# Patient Record
Sex: MALE | Race: WHITE
[De-identification: names, ages, dates, MRNs, and addresses within clinical notes are randomized per-mention and may not be internally consistent; named-entity substitution may affect disease eponyms.]

---

## 2019-01-08 ENCOUNTER — HOSPITAL ENCOUNTER (EMERGENCY)
Dept: HOSPITAL 46 - ED | Age: 49
Discharge: HOME | End: 2019-01-08
Payer: COMMERCIAL

## 2019-01-08 VITALS — WEIGHT: 184.99 LBS | HEIGHT: 73 IN | BODY MASS INDEX: 24.52 KG/M2

## 2019-01-08 DIAGNOSIS — Z79.899: ICD-10-CM

## 2019-01-08 DIAGNOSIS — S61.211A: Primary | ICD-10-CM

## 2019-01-08 DIAGNOSIS — Z87.891: ICD-10-CM
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
## 2019-01-08 DIAGNOSIS — Z88.0: ICD-10-CM

## 2019-01-08 PROCEDURE — 0HQGXZZ REPAIR LEFT HAND SKIN, EXTERNAL APPROACH: ICD-10-PCS

## 2019-01-08 NOTE — XMS
PreManage Notification: BECKI ORTIZ MRN:[id number]
 
Security Information
 
Security Events
1 event(s) in the past 18 months
Most recent security events:
 
Elopement at [facility] 09/07/2018 17:27
  -    Patient eloped before treatment completed.
Details:
    LWBS
 
 
 
CRITERIA MET
------------
- Group Notification
- [facility] - 2 Visits in 30 Days
 
 
CARE PROVIDERS
-------------------------------------------------------------------------------------
GILBERTO HUFF     Current
 
PHONE: [phone]
-------------------------------------------------------------------------------------
STEPHEN PHELAN     Current
 
PHONE: Unknown
-------------------------------------------------------------------------------------
GILBERTO HUFF     Primary Care     Current
 
PHONE: Unknown
-------------------------------------------------------------------------------------
 
[facility] has no Care Guidelines for this patient.
 
E.D. VISIT COUNT (12 MO.)
-------------------------------------------------------------------------------------
[address] [place] [address]
 
4 [facility]
-------------------------------------------------------------------------------------
TOTAL 5
-------------------------------------------------------------------------------------
NOTE: Visits indicate total known visits.
 
ED/UCC VISIT TRACKING (12 MO.)
-------------------------------------------------------------------------------------
01/08/2019 13:13
[facility] [place]
 
TYPE: Emergency
 
COMPLAINT:
- L FINGER LAC/INJURY
-------------------------------------------------------------------------------------
12/20/2018 12:33
 
[facility] [place]
 
TYPE: Emergency
 
COMPLAINT:
- CHEST PAIN
 
DIAGNOSES:
- Adverse effect of amphetamines, initial encounter
- Personal history of other infectious and parasitic diseases
- Nicotine dependence, unspecified, uncomplicated
- Chest pain, unspecified
- Allergy status to penicillin
-------------------------------------------------------------------------------------
09/08/2018 04:03
[facility] [place]
 
TYPE: Emergency
 
COMPLAINT:
- FEET PAIN/INJURY
 
DIAGNOSES:
- Pain in right foot
- Allergy status to penicillin
- Contusion of left foot, initial encounter
- Activity, other involving climbing, rappelling and jumping off
- Exposure to other specified factors, initial encounter
- Contusion of right foot, initial encounter
- Other long term (current) drug therapy
-------------------------------------------------------------------------------------
09/07/2018 17:27
[facility]
 
TYPE: Emergency
 
COMPLAINT:
- BILATERAL HEEL/FEET PAIN
 
DIAGNOSES:
- Pain in left ankle and joints of left foot
- Procedure and treatment not carried out due to patient leaving prior to being seen
by health care provider
- Encounter for immunization
-------------------------------------------------------------------------------------
03/07/2018 12:47
[facility]     [place]
 
TYPE: Urgent Care
 
DIAGNOSES:
- Cellulitis of right upper limb
- Hand Swelling
-------------------------------------------------------------------------------------
01/23/2018 12:50
[facility] [place]
 
TYPE: Emergency
 
COMPLAINT:
 
- L LEG CELLULITIS
 
DIAGNOSES:
- Cellulitis of left lower limb
- Nicotine dependence, unspecified, uncomplicated
-------------------------------------------------------------------------------------
 
 
INPATIENT VISIT TRACKING (12 MO.)
No inpatient visits to display in this time frame
 
[email or web address]

## 2020-04-28 NOTE — XMS
Encounter Summary
  Created on: 2020
 
 Caverly, Paul Duane
 External Reference #: [id number]
 : 72
 Sex: Male
 
 Demographics
 
 
+-----------------------+-----------------------------+
| Address               | [address]                  |
|                       | [place]  [address] |
+-----------------------+-----------------------------+
| Home Phone            | [phone]             |
+-----------------------+-----------------------------+
| Preferred Language    | Unknown                     |
+-----------------------+-----------------------------+
| Marital Status        | Single                      |
+-----------------------+-----------------------------+
| Rastafarian Affiliation | Unknown                     |
+-----------------------+-----------------------------+
| Race                  | Unknown                     |
+-----------------------+-----------------------------+
| Ethnic Group          | Unknown                     |
+-----------------------+-----------------------------+
 
 
 Author
 
 
+--------------+--------------------------------------------+
| Author       | [facility]  |
|              | and [place]                                |
+--------------+--------------------------------------------+
| Organization | [facility]  |
|              | and [place]                                |
+--------------+--------------------------------------------+
| Address      | Unknown                                    |
+--------------+--------------------------------------------+
| Phone        | Unavailable                                |
+--------------+--------------------------------------------+
 
 
 
 Support
 
 
+---------+--------------+---------+-----------------+
| Name    | Relationship | Address | Phone           |
+---------+--------------+---------+-----------------+
| Name No | ECON         | Unknown | [phone] |
+---------+--------------+---------+-----------------+
 
 
 
 Care Team Providers
 
 
 
+-----------------------+------+-------------+
| Care Team Member Name | Role | Phone       |
+-----------------------+------+-------------+
 PCP  | Unavailable |
+-----------------------+------+-------------+
 
 
 
 Encounter Details
 
 
+--------+-----------+----------------------+--------------------+-------------+
| Date   | Type      | Department           | Care Team          | Description |
+--------+-----------+----------------------+--------------------+-------------+
| 10/04/ | [facility]  |   [facility] |   Parvez Vizcaino   |             |
|    | Encounter |  MED CTR XRAY  [address]    |             |
|        |           |  [address] [place]       | [address]    |             |
|        |           | [place] [address] | [place] [address]    |             |
|        |           |   [phone]       | [phone]       |             |
|        |           |                      | [phone] (Fax) |             |
+--------+-----------+----------------------+--------------------+-------------+
 
 
 
 Social History
 
 
+----------------+-------+-----------+--------+------+
| Tobacco Use    | Types | Packs/Day | Years  | Date |
|                |       |           | Used   |      |
+----------------+-------+-----------+--------+------+
| Never Assessed |       |           |        |      |
+----------------+-------+-----------+--------+------+
 
 
 
+------------------+---------------+
| Sex Assigned at  | Date Recorded |
| Birth            |               |
+------------------+---------------+
| Not on file      |               |
+------------------+---------------+
 
 
 
+----------------+-------------+-------------+
| Job Start Date | Occupation  | Industry    |
+----------------+-------------+-------------+
| Not on file    | Not on file | Not on file |
+----------------+-------------+-------------+
 
 
 
+----------------+--------------+------------+
| Travel History | Travel Start | Travel End |
+----------------+--------------+------------+
 
 
 
 
+-------------------------------------+
| No recent travel history available. |
+-------------------------------------+
 documented as of this encounter
 
 Plan of Treatment
 Not on filedocumented as of this encounter
 
 Visit Diagnoses
 Not on filedocumented in this encounter

## 2020-04-28 NOTE — XMS
Encounter Summary
  Created on: 2020
 
 Caverly, Paul Duane
 External Reference #: [id number]
 : 72
 Sex: Male
 
 Demographics
 
 
+-----------------------+-----------------------------+
| Address               | [address]                  |
|                       | [place]  [address] |
+-----------------------+-----------------------------+
| Home Phone            | [phone]             |
+-----------------------+-----------------------------+
| Preferred Language    | Unknown                     |
+-----------------------+-----------------------------+
| Marital Status        | Single                      |
+-----------------------+-----------------------------+
| Hinduism Affiliation | Unknown                     |
+-----------------------+-----------------------------+
| Race                  | Unknown                     |
+-----------------------+-----------------------------+
| Ethnic Group          | Unknown                     |
+-----------------------+-----------------------------+
 
 
 Author
 
 
+--------------+--------------------------------------------+
| Author       | [facility]  |
|              | and [place]                                |
+--------------+--------------------------------------------+
| Organization | [facility] [place]  |
|              | and [place]                                |
+--------------+--------------------------------------------+
| Address      | Unknown                                    |
+--------------+--------------------------------------------+
| Phone        | Unavailable                                |
+--------------+--------------------------------------------+
 
 
 
 Support
 
 
+---------+--------------+---------+-----------------+
| Name    | Relationship | Address | Phone           |
+---------+--------------+---------+-----------------+
| Name No | ECON         | Unknown | [phone] |
+---------+--------------+---------+-----------------+
 
 
 
 Care Team Providers
 
 
 
+-----------------------+------+-------------+
| Care Team Member Name | Role | Phone       |
+-----------------------+------+-------------+
 PCP  | Unavailable |
+-----------------------+------+-------------+
 
 
 
 Encounter Details
 
 
+--------+-----------+----------------------+-----------+-------------+
| Date   | Type      | Department           | Care Team | Description |
+--------+-----------+----------------------+-----------+-------------+
| / | Hospital  |   [facility] |           |             |
|    | Encounter |  MED CTR XRAY  [address] |           |             |
|        |           |  [address]       |           |             |
|        |           | [place] [address] |           |             |
|        |           |   [phone]       |           |             |
+--------+-----------+----------------------+-----------+-------------+
 
 
 
 Social History
 
 
+----------------+-------+-----------+--------+------+
| Tobacco Use    | Types | Packs/Day | Years  | Date |
|                |       |           | Used   |      |
+----------------+-------+-----------+--------+------+
| Never Assessed |       |           |        |      |
+----------------+-------+-----------+--------+------+
 
 
 
+------------------+---------------+
| Sex Assigned at  | Date Recorded |
| Birth            |               |
+------------------+---------------+
| Not on file      |               |
+------------------+---------------+
 
 
 
+----------------+-------------+-------------+
| Job Start Date | Occupation  | Industry    |
+----------------+-------------+-------------+
| Not on file    | Not on file | Not on file |
+----------------+-------------+-------------+
 
 
 
+----------------+--------------+------------+
| Travel History | Travel Start | Travel End |
+----------------+--------------+------------+
 
 
 
 
+-------------------------------------+
| No recent travel history available. |
+-------------------------------------+
 documented as of this encounter
 
 Plan of Treatment
 Not on filedocumented as of this encounter
 
 Visit Diagnoses
 Not on filedocumented in this encounter

## 2020-04-28 NOTE — XMS
Encounter Summary
  Created on: 2020
 
 Caverly, Paul Duane
 External Reference #: [id number]
 : 72
 Sex: Male
 
 Demographics
 
 
+-----------------------+-----------------------------+
| Address               | [address]                  |
|                       | [place]  [address] |
+-----------------------+-----------------------------+
| Home Phone            | [phone]             |
+-----------------------+-----------------------------+
| Preferred Language    | Unknown                     |
+-----------------------+-----------------------------+
| Marital Status        | Single                      |
+-----------------------+-----------------------------+
| Taoism Affiliation | Unknown                     |
+-----------------------+-----------------------------+
| Race                  | Unknown                     |
+-----------------------+-----------------------------+
| Ethnic Group          | Unknown                     |
+-----------------------+-----------------------------+
 
 
 Author
 
 
+--------------+--------------------------------------------+
| Author       | [facility] [place]  |
|              | and [place]                                |
+--------------+--------------------------------------------+
| Organization | [facility] [place]  |
|              | and [place]                                |
+--------------+--------------------------------------------+
| Address      | Unknown                                    |
+--------------+--------------------------------------------+
| Phone        | Unavailable                                |
+--------------+--------------------------------------------+
 
 
 
 Support
 
 
+---------+--------------+---------+-----------------+
| Name    | Relationship | Address | Phone           |
+---------+--------------+---------+-----------------+
| Name No | ECON         | Unknown | [phone] |
+---------+--------------+---------+-----------------+
 
 
 
 Care Team Providers
 
 
 
+-----------------------+------+-------------+
| Care Team Member Name | Role | Phone       |
+-----------------------+------+-------------+
 PCP  | Unavailable |
+-----------------------+------+-------------+
 
 
 
 Encounter Details
 
 
+--------+-----------+----------------------+-----------+-------------+
| Date   | Type      | Department           | Care Team | Description |
+--------+-----------+----------------------+-----------+-------------+
| 05/10/ | Hospital  |   [facility] |           |             |
|    | Encounter |  MED CTR EMERGENCY   |           |             |
|        |           | CENTER  [address] |           |             |
|        |           |   [place]    |           |             |
|        |           | [address]           |           |             |
|        |           | [phone]         |           |             |
+--------+-----------+----------------------+-----------+-------------+
 
 
 
 Social History
 
 
+----------------+-------+-----------+--------+------+
| Tobacco Use    | Types | Packs/Day | Years  | Date |
|                |       |           | Used   |      |
+----------------+-------+-----------+--------+------+
| Never Assessed |       |           |        |      |
+----------------+-------+-----------+--------+------+
 
 
 
+------------------+---------------+
| Sex Assigned at  | Date Recorded |
| Birth            |               |
+------------------+---------------+
| Not on file      |               |
+------------------+---------------+
 
 
 
+----------------+-------------+-------------+
| Job Start Date | Occupation  | Industry    |
+----------------+-------------+-------------+
| Not on file    | Not on file | Not on file |
+----------------+-------------+-------------+
 
 
 
+----------------+--------------+------------+
| Travel History | Travel Start | Travel End |
+----------------+--------------+------------+
 
 
 
 
+-------------------------------------+
| No recent travel history available. |
+-------------------------------------+
 documented as of this encounter
 
 Plan of Treatment
 Not on filedocumented as of this encounter
 
 Visit Diagnoses
 Not on filedocumented in this encounter

## 2020-04-28 NOTE — XMS
Encounter Summary
  Created on: 2020
 
 Caverly, Paul Duane
 External Reference #: [id number]
 : 72
 Sex: Male
 
 Demographics
 
 
+-----------------------+-----------------------------+
| Address               | [address]                  |
|                       | [place]  [address] |
+-----------------------+-----------------------------+
| Home Phone            | [phone]             |
+-----------------------+-----------------------------+
| Preferred Language    | Unknown                     |
+-----------------------+-----------------------------+
| Marital Status        | Single                      |
+-----------------------+-----------------------------+
| Sabianism Affiliation | Unknown                     |
+-----------------------+-----------------------------+
| Race                  | Unknown                     |
+-----------------------+-----------------------------+
| Ethnic Group          | Unknown                     |
+-----------------------+-----------------------------+
 
 
 Author
 
 
+--------------+--------------------------------------------+
| Author       | [facility] [place]  |
|              | and [place]                                |
+--------------+--------------------------------------------+
| Organization | [facility] [place]  |
|              | and [place]                                |
+--------------+--------------------------------------------+
| Address      | Unknown                                    |
+--------------+--------------------------------------------+
| Phone        | Unavailable                                |
+--------------+--------------------------------------------+
 
 
 
 Support
 
 
+---------+--------------+---------+-----------------+
| Name    | Relationship | Address | Phone           |
+---------+--------------+---------+-----------------+
| Name No | ECON         | Unknown | [phone] |
+---------+--------------+---------+-----------------+
 
 
 
 Care Team Providers
 
 
 
+-----------------------+------+-------------+
| Care Team Member Name | Role | Phone       |
+-----------------------+------+-------------+
 PCP  | Unavailable |
+-----------------------+------+-------------+
 
 
 
 Encounter Details
 
 
+--------+-----------+----------------------+--------------------+-------------+
| Date   | Type      | Department           | Care Team          | Description |
+--------+-----------+----------------------+--------------------+-------------+
| / | [facility]  |   [facility] |   Hayden Farris   |             |
|    | Encounter |  MED CTR EMERGENCY   | [address]   |             |
|        |           | CENTER  [address] | [address]   [place]   |             |
|        |           |   [place]    | [place] [address]    |             |
|        |           | [address]           | [phone]       |             |
|        |           | [phone]         | [phone] (Fax) |             |
+--------+-----------+----------------------+--------------------+-------------+
 
 
 
 Social History
 
 
+----------------+-------+-----------+--------+------+
| Tobacco Use    | Types | Packs/Day | Years  | Date |
|                |       |           | Used   |      |
+----------------+-------+-----------+--------+------+
| Never Assessed |       |           |        |      |
+----------------+-------+-----------+--------+------+
 
 
 
+------------------+---------------+
| Sex Assigned at  | Date Recorded |
| Birth            |               |
+------------------+---------------+
| Not on file      |               |
+------------------+---------------+
 
 
 
+----------------+-------------+-------------+
| Job Start Date | Occupation  | Industry    |
+----------------+-------------+-------------+
| Not on file    | Not on file | Not on file |
+----------------+-------------+-------------+
 
 
 
+----------------+--------------+------------+
| Travel History | Travel Start | Travel End |
+----------------+--------------+------------+
 
 
 
 
+-------------------------------------+
| No recent travel history available. |
+-------------------------------------+
 documented as of this encounter
 
 Plan of Treatment
 Not on filedocumented as of this encounter
 
 Visit Diagnoses
 Not on filedocumented in this encounter

## 2020-04-28 NOTE — XMS
Encounter Summary
  Created on: 2020
 
 Caverly, Paul Duane
 External Reference #: [id number]
 : 72
 Sex: Male
 
 Demographics
 
 
+-----------------------+-----------------------------+
| Address               | [address]                  |
|                       | [place]  [address] |
+-----------------------+-----------------------------+
| Home Phone            | [phone]             |
+-----------------------+-----------------------------+
| Preferred Language    | Unknown                     |
+-----------------------+-----------------------------+
| Marital Status        | Single                      |
+-----------------------+-----------------------------+
| Zoroastrian Affiliation | Unknown                     |
+-----------------------+-----------------------------+
| Race                  | Unknown                     |
+-----------------------+-----------------------------+
| Ethnic Group          | Unknown                     |
+-----------------------+-----------------------------+
 
 
 Author
 
 
+--------------+--------------------------------------------+
| Author       | [facility]  |
|              | and [place]                                |
+--------------+--------------------------------------------+
| Organization | [facility]  |
|              | and [place]                                |
+--------------+--------------------------------------------+
| Address      | Unknown                                    |
+--------------+--------------------------------------------+
| Phone        | Unavailable                                |
+--------------+--------------------------------------------+
 
 
 
 Support
 
 
+---------+--------------+---------+-----------------+
| Name    | Relationship | Address | Phone           |
+---------+--------------+---------+-----------------+
| Name No | ECON         | Unknown | [phone] |
+---------+--------------+---------+-----------------+
 
 
 
 Care Team Providers
 
 
 
+-----------------------+------+-------------+
| Care Team Member Name | Role | Phone       |
+-----------------------+------+-------------+
 PCP  | Unavailable |
+-----------------------+------+-------------+
 
 
 
 Encounter Details
 
 
+--------+-----------+----------------------+-----------+-------------+
| Date   | Type      | Department           | Care Team | Description |
+--------+-----------+----------------------+-----------+-------------+
| / | Hospital  |   [facility] |           |             |
|    | Encounter |  [facility]   |           |             |
|        |           | CENTER  [address] |           |             |
|        |           |   [place]    |           |             |
|        |           | [address]           |           |             |
|        |           | [phone]         |           |             |
+--------+-----------+----------------------+-----------+-------------+
 
 
 
 Social History
 
 
+----------------+-------+-----------+--------+------+
| Tobacco Use    | Types | Packs/Day | Years  | Date |
|                |       |           | Used   |      |
+----------------+-------+-----------+--------+------+
| Never Assessed |       |           |        |      |
+----------------+-------+-----------+--------+------+
 
 
 
+------------------+---------------+
| Sex Assigned at  | Date Recorded |
| Birth            |               |
+------------------+---------------+
| Not on file      |               |
+------------------+---------------+
 
 
 
+----------------+-------------+-------------+
| Job Start Date | Occupation  | Industry    |
+----------------+-------------+-------------+
| Not on file    | Not on file | Not on file |
+----------------+-------------+-------------+
 
 
 
+----------------+--------------+------------+
| Travel History | Travel Start | Travel End |
+----------------+--------------+------------+
 
 
 
 
+-------------------------------------+
| No recent travel history available. |
+-------------------------------------+
 documented as of this encounter
 
 Plan of Treatment
 Not on filedocumented as of this encounter
 
 Visit Diagnoses
 Not on filedocumented in this encounter

## 2020-04-28 NOTE — XMS
Encounter Summary
  Created on: 2020
 
 Caverly, Paul Duane
 External Reference #: [id number]
 : 72
 Sex: Male
 
 Demographics
 
 
+-----------------------+-----------------------------+
| Address               | [address]                  |
|                       | [place]  [address] |
+-----------------------+-----------------------------+
| Home Phone            | [phone]             |
+-----------------------+-----------------------------+
| Preferred Language    | Unknown                     |
+-----------------------+-----------------------------+
| Marital Status        | Single                      |
+-----------------------+-----------------------------+
| Faith Affiliation | Unknown                     |
+-----------------------+-----------------------------+
| Race                  | Unknown                     |
+-----------------------+-----------------------------+
| Ethnic Group          | Unknown                     |
+-----------------------+-----------------------------+
 
 
 Author
 
 
+--------------+--------------------------------------------+
| Author       | [facility]  |
|              | [facility] [place]                                |
+--------------+--------------------------------------------+
| Organization | [facility]  |
|              | and [place]                                |
+--------------+--------------------------------------------+
| Address      | Unknown                                    |
+--------------+--------------------------------------------+
| Phone        | Unavailable                                |
+--------------+--------------------------------------------+
 
 
 
 Support
 
 
+---------+--------------+---------+-----------------+
| Name    | Relationship | Address | Phone           |
+---------+--------------+---------+-----------------+
| Name No | ECON         | Unknown | [phone] |
+---------+--------------+---------+-----------------+
 
 
 
 Care Team Providers
 
 
 
+-----------------------+------+-------------+
| Care Team Member Name | Role | Phone       |
+-----------------------+------+-------------+
 PCP  | Unavailable |
+-----------------------+------+-------------+
 
 
 
 Encounter Details
 
 
+--------+-----------+----------------------+---------------------+-------------+
| Date   | Type      | Department           | Care Team           | Description |
+--------+-----------+----------------------+---------------------+-------------+
| / | Hospital  |   [facility] |   [facility]  |             |
|    | Encounter |  [facility]   | MD [facility]  [address]    |             |
|        |           | CENTER  [address] | [place]     |             |
|        |           |   [place]    | [place] [address]     |             |
|        |           | [address]           | [phone]        |             |
|        |           | [phone]         | [phone] (Fax)  |             |
+--------+-----------+----------------------+---------------------+-------------+
 
 
 
 Social History
 
 
+----------------+-------+-----------+--------+------+
| Tobacco Use    | Types | Packs/Day | Years  | Date |
|                |       |           | Used   |      |
+----------------+-------+-----------+--------+------+
| Never Assessed |       |           |        |      |
+----------------+-------+-----------+--------+------+
 
 
 
+------------------+---------------+
| Sex Assigned at  | Date Recorded |
| Birth            |               |
+------------------+---------------+
| Not on file      |               |
+------------------+---------------+
 
 
 
+----------------+-------------+-------------+
| Job Start Date | Occupation  | Industry    |
+----------------+-------------+-------------+
| Not on file    | Not on file | Not on file |
+----------------+-------------+-------------+
 
 
 
+----------------+--------------+------------+
| Travel History | Travel Start | Travel End |
+----------------+--------------+------------+
 
 
 
 
+-------------------------------------+
| No recent travel history available. |
+-------------------------------------+
 documented as of this encounter
 
 Plan of Treatment
 Not on filedocumented as of this encounter
 
 Visit Diagnoses
 Not on filedocumented in this encounter

## 2020-04-28 NOTE — XMS
Encounter Summary
  Created on: 2020
 
 Caverly, Paul Duane
 External Reference #: [id number]
 : 72
 Sex: Male
 
 Demographics
 
 
+-----------------------+-----------------------------+
| Address               | [address]                  |
|                       | [place]  [address] |
+-----------------------+-----------------------------+
| Home Phone            | [phone]             |
+-----------------------+-----------------------------+
| Preferred Language    | Unknown                     |
+-----------------------+-----------------------------+
| Marital Status        | Single                      |
+-----------------------+-----------------------------+
| Jewish Affiliation | Unknown                     |
+-----------------------+-----------------------------+
| Race                  | Unknown                     |
+-----------------------+-----------------------------+
| Ethnic Group          | Unknown                     |
+-----------------------+-----------------------------+
 
 
 Author
 
 
+--------------+--------------------------------------------+
| Author       | [facility]  |
|              | and [place]                                |
+--------------+--------------------------------------------+
| Organization | [facility]  |
|              | and [place]                                |
+--------------+--------------------------------------------+
| Address      | Unknown                                    |
+--------------+--------------------------------------------+
| Phone        | Unavailable                                |
+--------------+--------------------------------------------+
 
 
 
 Support
 
 
+---------+--------------+---------+-----------------+
| Name    | Relationship | Address | Phone           |
+---------+--------------+---------+-----------------+
| Name No | ECON         | Unknown | [phone] |
+---------+--------------+---------+-----------------+
 
 
 
 Care Team Providers
 
 
 
+-----------------------+------+-------------+
| Care Team Member Name | Role | Phone       |
+-----------------------+------+-------------+
| No, Physician         | PCP  | Unavailable |
+-----------------------+------+-------------+
 
 
 
 Reason for Visit
 
 
+---------------+-----------------------------+
| Reason        | Comments                    |
+---------------+-----------------------------+
| Hand Swelling | room 2/ right hand x 3 days |
+---------------+-----------------------------+
 
 
 
 Encounter Details
 
 
+--------+---------+----------------------+----------------------+----------------------+
| Date   | Type    | Department           | Care Team            | Description          |
+--------+---------+----------------------+----------------------+----------------------+
| / | Office  |   [facility]   |   Damian Vidal | Cellulitis of right  |
| 2018   | Visit   | CARE  [address] |  MD [place]OTHY  [address]   | hand (Primary Dx)    |
|        |         |   [place]    | [address]  [place] |                      |
|        |         | [address]           |  [address]  [phone] |                      |
|        |         | [phone]         |   [phone] (Fax) |                      |
+--------+---------+----------------------+----------------------+----------------------+
 
 
 
 Social History
 
 
+--------------------+-------+-----------+--------+------+
| Tobacco Use        | Types | Packs/Day | Years  | Date |
|                    |       |           | Used   |      |
+--------------------+-------+-----------+--------+------+
| Current Every Day  |       |           |        |      |
| Smoker             |       |           |        |      |
+--------------------+-------+-----------+--------+------+
 
 
 
+---------------------+---+---+---+
| Smokeless Tobacco:  |   |   |   |
| Never Used          |   |   |   |
+---------------------+---+---+---+
 
 
 
+------------------+---------------+
| Sex Assigned at  | Date Recorded |
| Birth            |               |
+------------------+---------------+
 
| Not on file      |               |
+------------------+---------------+
 
 
 
+----------------+-------------+-------------+
| Job Start Date | Occupation  | Industry    |
+----------------+-------------+-------------+
| Not on file    | Not on file | Not on file |
+----------------+-------------+-------------+
 
 
 
+----------------+--------------+------------+
| Travel History | Travel Start | Travel End |
+----------------+--------------+------------+
 
 
 
+-------------------------------------+
| No recent travel history available. |
+-------------------------------------+
 documented as of this encounter
 
 Last Filed Vital Signs
 
 
+-------------------+----------------------+----------------------+----------+
| Vital Sign        | Reading              | Time Taken           | Comments |
+-------------------+----------------------+----------------------+----------+
| Blood Pressure    | 124/84               | 2018  1:11 PM  |          |
|                   |                      | PST                  |          |
+-------------------+----------------------+----------------------+----------+
| Pulse             | 103                  | 2018  1:11 PM  |          |
|                   |                      | PST                  |          |
+-------------------+----------------------+----------------------+----------+
| Temperature       | 36.3   C (97.4   F)  | 2018  1:11 PM  |          |
|                   |                      | PST                  |          |
+-------------------+----------------------+----------------------+----------+
| Respiratory Rate  | 16                   | 2018  1:11 PM  |          |
|                   |                      | PST                  |          |
+-------------------+----------------------+----------------------+----------+
| Oxygen Saturation | 99%                  | 2018  1:11 PM  |          |
|                   |                      | PST                  |          |
+-------------------+----------------------+----------------------+----------+
| Inhaled Oxygen    | -                    | -                    |          |
| Concentration     |                      |                      |          |
+-------------------+----------------------+----------------------+----------+
| Weight            | 87.3 kg (192 lb 7.4  | 2018  1:11 PM  |          |
|                   | oz)                  | PST                  |          |
+-------------------+----------------------+----------------------+----------+
| Height            | 185.4 cm (6' 1")     | 2018  1:11 PM  |          |
|                   |                      | PST                  |          |
+-------------------+----------------------+----------------------+----------+
| Body Mass Index   | 25.39                | 2018  1:11 PM  |          |
|                   |                      | PST                  |          |
+-------------------+----------------------+----------------------+----------+
 documented in this encounter
 
 Patient Instructions
 
 Patient Instructions Damian Vidal MD - 2018 12:45 PM PSTRocephin shot was pr
ovided for infection.
 Take Keflex 500 mg 4 times daily for 10 days for skin infection.
 If you develop an itchy rash, hives, lip swelling or wheezing, stop Keflex and report to [facility] emergency room.
 Take Naprosyn 500 mg, 1 tablet with food twice daily for 5 days, then as needed for pain an
d swelling.
 Take tramadol 50 mg, one tablet up to 3 times daily as needed for breakthrough pain.
 The right hand and arm above the level of your heart as needed for pain and swelling.
 Use warm, moist compresses on the hand and forearm for 15 minutes at a time 3 times daily u
ntil improved.
 Stay home from work avoiding repetitive gripping, grasping and lifting with the right hand 
until symptoms have resolved.
 Return for reevaluation of your hand and forearm tomorrow.
 Report to emergency room if he developed progressive pain, redness, swelling, nausea, vomit
ing or other new rapidly progressive associated symptoms.
 
 Cellulitis
 Cellulitis is an infection of the deep layers of skin. A break in the skin, such as a cut o
r scratch, can let bacteria under the skin. If the bacteria get to deep layers of the skin, 
it can be serious. If not treated, cellulitis can get into the bloodstream and lymph nodes. 
The infection can then spread throughout the body. This causes serious illness.
 Cellulitis causes the affected skin to become red, swollen, warm, and sore. The reddened ar
eas have a visible border. An open sore may leak fluid (pus). You may have a fever, chills, 
and pain.
 Cellulitis is treated with antibiotics taken for 7 to 10 days. An open sore may be cleaned 
and covered with cool wet gauze. Symptoms should get better 1 to 2 days after treatment is s
tarted. Make sure to take all the antibiotics for the full number of days until they are apryl
e. Keep taking the medicine even if your symptoms go away.
 Home care
 Follow these tips:
  Limit the use of the part of your body with cellulitis.
  If the infection is on your leg, keep your leg raised while sitting. This will help to r
educe swelling.
  Take all of the antibiotic medicine exactly as directed until it is gone. Do not miss an
y doses, especially during the first 7 days. Don  t stop taking the medicine when your symp
toms get better.
  Keep the affected area clean and dry.
  Wash your hands with soap and warm water before and after touching your skin. Anyone els
e who touches your skin should also wash his or her hands. Don't share towels.
 Follow-up care
 Follow up with your healthcare provider, or as advised. If your infection does not go away 
on the first antibiotic, your healthcare provider will prescribe a different one.
 When to seek medical advice
 Call your healthcare provider right away if any of these occur:
  Red areas that spread
  Swelling or pain that gets worse
  Fluid leaking from the skin (pus)
  Fever higher of 100.4 F (38.0 C) or higher after 2 days on antibiotics
 Date Last Reviewed: 2016-2017 [employer]. [address] [place] [address]. All righ
ts reserved. This information is not intended as a substitute for professional medical care.
 Always follow your healthcare professional's instructions.
 
 Electronically signed by Damian Vidal MD at 2018  2:31 PM PST
 documented in this encounter
 
 Progress Notes
 Mandy Lazaro RN - 2018 12:45 PM PSTFormatting of this note might be different
 from the original.
 
 Administrations This Visit  
  cefTRIAXone (ROCEPHIN) injection 1 g  
  Admin Date
 2018 Action
 Given Dose
 1 g Route
 Intramuscular Administered By
 Mandy Lazaro RN 
  
  
  lidocaine 1% injection 2.1 mL  
  Admin Date
 2018 Action
 Given Dose
 2.1 mL Route
 Intramuscular Administered By
 Mandy Lazaro RN 
  
  
  
 
 Pt tolerated well. Mandy Lazaro RN
 Electronically signed by Mandy Lazaro RN at 2018  2:55 PM Winston, Antonietta AGUIRRE MD - 2018 12:45 PM PSTFormatting of this note might be different from the origina
l.
 3/7/2018
 
 Paul Duane Caverly
 1970
 
 Assessment:
 1. Cellulitis of right hand  cefTRIAXone (ROCEPHIN) injection 1 g 
  lidocaine 1% injection 2.1 mL 
  naproxen (NAPROSYN) 500 mg tablet 
  traMADol (ULTRAM) 50 mg tablet 
  cephalexin (KEFLEX) 500 mg capsule 
 
 Three-day history of progressive cellulitis on the back of the right hand associated with m
ultiple small abrasions on the hand, a new tattoo on the right forearm placed 10 days ago.  
No evidence of septic arthritis or systemic symptoms.  He agrees with treatment with IM Roce
phin after discussing the risks and benefits given his penicillin allergy.  We discussed the
 option of starting oral clindamycin or waiting for intravenous dose of alternative antibiot
ic.  He opted for the Rocephin IM.  He was given the following instructions, prescriptions a
nd a note for work.  Follow-up here tomorrow is anticipated.
 
 Plan:
 
 Rocephin shot was provided for infection.
 Take Keflex 500 mg 4 times daily for 10 days for skin infection.
 If you develop an itchy rash, hives, lip swelling or wheezing, stop Keflex and report to [facility] emergency room.
 Take Naprosyn 500 mg, 1 tablet with food twice daily for 5 days, then as needed for pain an
d swelling.
 Take tramadol 50 mg, one tablet up to 3 times daily as needed for breakthrough pain.
 The right hand and arm above the level of your heart as needed for pain and swelling.
 Use warm, moist compresses on the hand and forearm for 15 minutes at a time 3 times daily u
ntil improved.
 Stay home from work avoiding repetitive gripping, grasping and lifting with the right hand 
until symptoms have resolved.
 Return for reevaluation of your hand and forearm tomorrow.
 
 Report to emergency room if he developed progressive pain, redness, swelling, nausea, vomit
ing or other new rapidly progressive associated symptoms.
 
 The risks and benefits, including potential side effects of medication changes, have been d
iscussed with the patient.  We agreed on implementing the current plan.
 The note may have been dictated using Dragon voice recognition software.  It may have not b
een proofread in entirety.  Minor errors in grammar may occur.
 
 History:
 Chief Complaint 
 Patient presents with 
   Hand Swelling 
   room 2/ right hand x 3 days 
 
 Paul Duane Caverly is a 48 y.o. male here for painful redness and swelling on the back of h
is right hand extending onto his forearm starting 3 days ago.  He is a  and frequently
 cuts his fingers and hands in the process of daily work.  He had a tattoo placed on the vol
ar aspect of his right forearm 10 days ago which has been without redness, swelling and drai
nage.  He had difficulty sleeping last night applying warm compress and elevating his arm.  
He went to work today and cannot due to pain with use of the hand and arm.  Denies other inj
ury to the right hand, no history of trauma. History of cellulitis of the left lower leg one
 month ago treated at [facility] with resolution, doesn't recall the antibiotic.  He
 denies fever but feels intermittent chills.  Mild nausea without vomiting.  He has eaten to
day with normal bowel movement.  No history of diabetes mellitus or MRSA infection.
 
 Current medications, past medical, surgical, family and social histories were reviewed and 
updated where appropriate.
 
 Allergies 
 Allergen Reactions 
   Amoxicillin Hives 
 
 Physical Exam:
 /84  | Pulse 103  | Temp 36.3 C (97.4 F) (Temporal)  | Resp 16  | Ht 1.854 m (6' 
1")  | Wt 87.3 kg (192 lb 7.4 oz)  | SpO2 99%  | BMI 25.39 kg/m 
 General: Well appearing, middle-aged male in no distress and appears stated age.
 Skin: Multiple, small, scabbed abrasions on multiple fingers and the back of his right hand
.  No pustules, fluctuance or purulent drainage.  Fingers are callused.  Diffusely erythemat
ous, tender, warm and edematous skin on back of his hand without fluctuance or ecchymosis.  
Mild erythema extends onto the dorsal aspect of the wrist and forearm with several small fol
licular pustules noted on the proximal forearm.  Again, no fluctuance or drainage.  New tatt
oo noted on the proximal, volar forearm without inflammation or drainage.
 Musculoskeletal: Opens and closes the fist completely with minimal pain.  Normal finger spl
ay.  Range of motion of the right wrist and elbow are complete with minimal discomfort.
 Lymphatic: Perhaps mild lymphangitis on the dorsum of the right forearm.  No antecubital or
 axillary adenopathy.
 Neurovascular: Intact radial pulses.  Light touch sensation and capillary refill intact thr
oughout the right hand.
 
 Damian Vdial M.D.
 
 Electronically signed by Damian Vidal MD at 2018  2:55 PM PSTdocumented in 
is encounter
 
 Plan of Treatment
 Not on filedocumented as of this encounter
 
 Visit Diagnoses
 
 
 
+------------------------------------------------------------------------------------------+
| Diagnosis                                                                                |
+------------------------------------------------------------------------------------------+
|   Cellulitis of right hand - Primary  Cellulitis and abscess of hand, except fingers and |
|  thumb                                                                                   |
+------------------------------------------------------------------------------------------+
 documented in this encounter
 
 Administered Medications
 
 
+-----------------------------------+--------+----------+------+------+----------+
| Medication Order                  | MAR    | Action   | Dose | Rate | Site     |
|                                   | Action | Date     |      |      |          |
+-----------------------------------+--------+----------+------+------+----------+
|   cefTRIAXone (ROCEPHIN)          | Given  | 20 | 1 g  |      | Ventrogl |
| injection 1 g  1 g,               |        | 18  2:15 |      |      | uteal-Le |
| Intramuscular, ONCE, Wed 3/7/18   |        |  PM PST  |      |      | ft       |
| at 1430, For 1 dose, MIX WITH 2.1 |        |          |      |      |          |
|  ML LIDOCAINE, Indications:       |        |          |      |      |          |
| PURULENT SKIN AND SOFT TISSUE     |        |          |      |      |          |
| INFECTION                         |        |          |      |      |          |
+-----------------------------------+--------+----------+------+------+----------+
 
 
 
+---+---+
|   |   |
+---+---+
 
 
 
+-----------------------------------+-------+----------+---------+---+----------+
|   lidocaine 1% injection 2.1 mL   | Given | 20 | 2.1 mLs |   | Ventrogl |
| 2.1 mL, Intramuscular, ONCE, Wed  |       | 18  2:18 |         |   | uteal-Le |
| 3/7/18 at 1430, For 1 dose        |       |  PM PST  |         |   | ft       |
+-----------------------------------+-------+----------+---------+---+----------+
 
 
 
+---+---+
|   |   |
+---+---+
 documented in this encounter

## 2020-04-28 NOTE — XMS
Encounter Summary
  Created on: 2020
 
 Caverly, Paul Duane
 External Reference #: [id number]
 : 72
 Sex: Male
 
 Demographics
 
 
+-----------------------+-----------------------------+
| Address               | [address]                  |
|                       | [facility] [place]  [address] |
+-----------------------+-----------------------------+
| Home Phone            | [phone]             |
+-----------------------+-----------------------------+
| Preferred Language    | Unknown                     |
+-----------------------+-----------------------------+
| Marital Status        | Single                      |
+-----------------------+-----------------------------+
| Episcopal Affiliation | Unknown                     |
+-----------------------+-----------------------------+
| Race                  | Unknown                     |
+-----------------------+-----------------------------+
| Ethnic Group          | Unknown                     |
+-----------------------+-----------------------------+
 
 
 Author
 
 
+--------------+--------------------------------------------+
| Author       | [facility]  |
|              | and [place]                                |
+--------------+--------------------------------------------+
| Organization | [facility]  |
|              | and [place]                                |
+--------------+--------------------------------------------+
| Address      | Unknown                                    |
+--------------+--------------------------------------------+
| Phone        | Unavailable                                |
+--------------+--------------------------------------------+
 
 
 
 Support
 
 
+---------+--------------+---------+-----------------+
| Name    | Relationship | Address | Phone           |
+---------+--------------+---------+-----------------+
| Name No | ECON         | Unknown | [phone] |
+---------+--------------+---------+-----------------+
 
 
 
 Care Team Providers
 
 
 
+-----------------------+------+-------------+
| Care Team Member Name | Role | Phone       |
+-----------------------+------+-------------+
 PCP  | Unavailable |
+-----------------------+------+-------------+
 
 
 
 Encounter Details
 
 
+--------+-----------+----------------------+----------------------+-------------+
| Date   | Type      | Department           | Care Team            | Description |
+--------+-----------+----------------------+----------------------+-------------+
| / | Hospital  |   [facility] |   Estrellita Hightower  |             |
|    | Encounter |  [facility]   | MD [facility]  |             |
|        |           | CENTER  [address] | [address]  [place],  |             |
|        |           |   [place]    | [place] [address]             |             |
|        |           | [address]           | [phone]         |             |
|        |           | [phone]         | [phone] (Fax)   |             |
+--------+-----------+----------------------+----------------------+-------------+
 
 
 
 Social History
 
 
+----------------+-------+-----------+--------+------+
| Tobacco Use    | Types | Packs/Day | Years  | Date |
|                |       |           | Used   |      |
+----------------+-------+-----------+--------+------+
| Never Assessed |       |           |        |      |
+----------------+-------+-----------+--------+------+
 
 
 
+------------------+---------------+
| Sex Assigned at  | Date Recorded |
| Birth            |               |
+------------------+---------------+
| Not on file      |               |
+------------------+---------------+
 
 
 
+----------------+-------------+-------------+
| Job Start Date | Occupation  | Industry    |
+----------------+-------------+-------------+
| Not on file    | Not on file | Not on file |
+----------------+-------------+-------------+
 
 
 
+----------------+--------------+------------+
| Travel History | Travel Start | Travel End |
+----------------+--------------+------------+
 
 
 
 
+-------------------------------------+
| No recent travel history available. |
+-------------------------------------+
 documented as of this encounter
 
 Plan of Treatment
 Not on filedocumented as of this encounter
 
 Visit Diagnoses
 Not on filedocumented in this encounter

## 2020-04-28 NOTE — XMS
Encounter Summary
  Created on: 2020
 
 Caverly, Paul Duane
 External Reference #: [id number]
 : 72
 Sex: Male
 
 Demographics
 
 
+-----------------------+-----------------------------+
| Address               | [address]                  |
|                       | [place]  [address] |
+-----------------------+-----------------------------+
| Home Phone            | [phone]             |
+-----------------------+-----------------------------+
| Preferred Language    | Unknown                     |
+-----------------------+-----------------------------+
| Marital Status        | Single                      |
+-----------------------+-----------------------------+
| Anabaptist Affiliation | Unknown                     |
+-----------------------+-----------------------------+
| Race                  | Unknown                     |
+-----------------------+-----------------------------+
| Ethnic Group          | Unknown                     |
+-----------------------+-----------------------------+
 
 
 Author
 
 
+--------------+--------------------------------------------+
| Author       | [facility]  |
|              | and [place]                                |
+--------------+--------------------------------------------+
| Organization | [facility]  |
|              | and [place]                                |
+--------------+--------------------------------------------+
| Address      | Unknown                                    |
+--------------+--------------------------------------------+
| Phone        | Unavailable                                |
+--------------+--------------------------------------------+
 
 
 
 Support
 
 
+---------+--------------+---------+-----------------+
| Name    | Relationship | Address | Phone           |
+---------+--------------+---------+-----------------+
| Name No | ECON         | Unknown | [phone] |
+---------+--------------+---------+-----------------+
 
 
 
 Care Team Providers
 
 
 
+-----------------------+------+-------------+
| Care Team Member Name | Role | Phone       |
+-----------------------+------+-------------+
 PCP  | Unavailable |
+-----------------------+------+-------------+
 
 
 
 Encounter Details
 
 
+--------+-----------+----------------------+----------------------+-------------+
| Date   | Type      | Department           | Care Team            | Description |
+--------+-----------+----------------------+----------------------+-------------+
| / | Hospital  |   [facility] |   Estrell[facility] Hight[facility]  |             |
|    | Encounter |  [facility]   | [facility]  |             |
|        |           | CENTER  [address] | [address]  [place],  |             |
|        |           |   [place]    | [place] [address]             |             |
|        |           | [address]           | [phone]         |             |
|        |           | [phone]         | [phone] (Fax)   |             |
+--------+-----------+----------------------+----------------------+-------------+
 
 
 
 Social History
 
 
+----------------+-------+-----------+--------+------+
| Tobacco Use    | Types | Packs/Day | Years  | Date |
|                |       |           | Used   |      |
+----------------+-------+-----------+--------+------+
| Never Assessed |       |           |        |      |
+----------------+-------+-----------+--------+------+
 
 
 
+------------------+---------------+
| Sex Assigned at  | Date Recorded |
| Birth            |               |
+------------------+---------------+
| Not on file      |               |
+------------------+---------------+
 
 
 
+----------------+-------------+-------------+
| Job Start Date | Occupation  | Industry    |
+----------------+-------------+-------------+
| Not on file    | Not on file | Not on file |
+----------------+-------------+-------------+
 
 
 
+----------------+--------------+------------+
| Travel History | Travel Start | Travel End |
+----------------+--------------+------------+
 
 
 
 
+-------------------------------------+
| No recent travel history available. |
+-------------------------------------+
 documented as of this encounter
 
 Plan of Treatment
 Not on filedocumented as of this encounter
 
 Visit Diagnoses
 Not on filedocumented in this encounter

## 2020-04-28 NOTE — XMS
Encounter Summary
  Created on: 2020
 
 Caverly, Paul Duane
 External Reference #: [id number]
 : 72
 Sex: Male
 
 Demographics
 
 
+-----------------------+-----------------------------+
| Address               | [address]                  |
|                       | [place]  [address] |
+-----------------------+-----------------------------+
| Home Phone            | [phone]             |
+-----------------------+-----------------------------+
| Preferred Language    | Unknown                     |
+-----------------------+-----------------------------+
| Marital Status        | Single                      |
+-----------------------+-----------------------------+
| Baptism Affiliation | Unknown                     |
+-----------------------+-----------------------------+
| Race                  | Unknown                     |
+-----------------------+-----------------------------+
| Ethnic Group          | Unknown                     |
+-----------------------+-----------------------------+
 
 
 Author
 
 
+--------------+--------------------------------------------+
| Author       | [facility]  |
|              | and [place]                                |
+--------------+--------------------------------------------+
| Organization | [facility]  |
|              | and [place]                                |
+--------------+--------------------------------------------+
| Address      | Unknown                                    |
+--------------+--------------------------------------------+
| Phone        | Unavailable                                |
+--------------+--------------------------------------------+
 
 
 
 Support
 
 
+---------+--------------+---------+-----------------+
| Name    | Relationship | Address | Phone           |
+---------+--------------+---------+-----------------+
| Name No | ECON         | Unknown | [phone] |
+---------+--------------+---------+-----------------+
 
 
 
 Care Team Providers
 
 
 
+-----------------------+------+-------------+
| Care Team Member Name | Role | Phone       |
+-----------------------+------+-------------+
 PCP  | Unavailable |
+-----------------------+------+-------------+
 
 
 
 Encounter Details
 
 
+--------+-----------+----------------------+-----------+-------------+
| Date   | Type      | Department           | Care Team | Description |
+--------+-----------+----------------------+-----------+-------------+
| / | Hospital  |   [facility] |           |             |
|    | Encounter |  [facility] EM[facility]Y   |           |             |
|        |           | CENTER  [address] |           |             |
|        |           |   [place]    |           |             |
|        |           | [address]           |           |             |
|        |           | [phone]         |           |             |
+--------+-----------+----------------------+-----------+-------------+
 
 
 
 Social History
 
 
+----------------+-------+-----------+--------+------+
| Tobacco Use    | Types | Packs/Day | Years  | Date |
|                |       |           | Used   |      |
+----------------+-------+-----------+--------+------+
| Never Assessed |       |           |        |      |
+----------------+-------+-----------+--------+------+
 
 
 
+------------------+---------------+
| Sex Assigned at  | Date Recorded |
| Birth            |               |
+------------------+---------------+
| Not on file      |               |
+------------------+---------------+
 
 
 
+----------------+-------------+-------------+
| Job Start Date | Occupation  | Industry    |
+----------------+-------------+-------------+
| Not on file    | Not on file | Not on file |
+----------------+-------------+-------------+
 
 
 
+----------------+--------------+------------+
| Travel History | Travel Start | Travel End |
+----------------+--------------+------------+
 
 
 
 
+-------------------------------------+
| No recent travel history available. |
+-------------------------------------+
 documented as of this encounter
 
 Plan of Treatment
 Not on filedocumented as of this encounter
 
 Visit Diagnoses
 Not on filedocumented in this encounter

## 2020-04-28 NOTE — XMS
PreManage Notification: BECKI ORTIZ MRN:[id number]
 
Security Information
 
Security Events
No recent Security Events currently on file
 
 
 
CRITERIA MET
------------
- Group Notification
- [facility] - Has Care Guidelines
 
 
CARE PROVIDERS
-------------------------------------------------------------------------------------
GILBERTO HUFF      Physician Assistant     Current
 
PHONE: [phone]
-------------------------------------------------------------------------------------
STEPHEN PHELAN      Physician Assistant     Current
 
PHONE: Unknown
-------------------------------------------------------------------------------------
 
[facility] has no Care Guidelines for this patient.
Care History
Medical/Surgical
01/09/2019    [facility]
 
      - W CALLED PATIENT 2X LEFT A VOICEMAIL. 
      - PATIENT NEEDS TO APPLY FOR MEDICAL BENEFITS- PLEASE CONTACT NICHELLE - 9838 TO HAVE PATIENT APPLY FOR BENEFITS.
      - PATIENT DOES NOT HAVE A PCP - PLEASE REFER PATIENT TO [facility] FOR 
      FOLLOW UP - TO ESTABLISH CARE.
      - W SENT NO PCP LETTER TO PATIENT.
E.D. VISIT COUNT (12 MO.)
-------------------------------------------------------------------------------------
1 [facility]
-------------------------------------------------------------------------------------
TOTAL 1
-------------------------------------------------------------------------------------
NOTE: Visits indicate total known visits.
 
ED/UCC VISIT TRACKING (12 MO.)
-------------------------------------------------------------------------------------
04/28/2020 13:46
[facility] [place]
 
TYPE: Emergency
 
COMPLAINT:
- RT FINGER INJURY
-------------------------------------------------------------------------------------
 
 
INPATIENT VISIT TRACKING (12 MO.)
No inpatient visits to display in this time frame
 
 
[email or web address]

## 2020-04-28 NOTE — XMS
Encounter Summary
  Created on: 2020
 
 Caverly, Paul Duane
 External Reference #: [id number]
 : 72
 Sex: Male
 
 Demographics
 
 
+-----------------------+-----------------------------+
| Address               | [address]                  |
|                       | [place]  [address] |
+-----------------------+-----------------------------+
| Home Phone            | [phone]             |
+-----------------------+-----------------------------+
| Preferred Language    | Unknown                     |
+-----------------------+-----------------------------+
| Marital Status        | Single                      |
+-----------------------+-----------------------------+
| Mu-ism Affiliation | Unknown                     |
+-----------------------+-----------------------------+
| Race                  | Unknown                     |
+-----------------------+-----------------------------+
| Ethnic Group          | Unknown                     |
+-----------------------+-----------------------------+
 
 
 Author
 
 
+--------------+--------------------------------------------+
| Author       | [facility] [place]  |
|              | and [place]                                |
+--------------+--------------------------------------------+
| Organization | [facility] [place]  |
|              | and [place]                                |
+--------------+--------------------------------------------+
| Address      | Unknown                                    |
+--------------+--------------------------------------------+
| Phone        | Unavailable                                |
+--------------+--------------------------------------------+
 
 
 
 Support
 
 
+---------+--------------+---------+-----------------+
| Name    | Relationship | Address | Phone           |
+---------+--------------+---------+-----------------+
| Name No | ECON         | Unknown | [phone] |
+---------+--------------+---------+-----------------+
 
 
 
 Care Team Providers
 
 
 
+-----------------------+------+-------------+
| Care Team Member Name | Role | Phone       |
+-----------------------+------+-------------+
 PCP  | Unavailable |
+-----------------------+------+-------------+
 
 
 
 Encounter Details
 
 
+--------+-----------+----------------------+-----------+-------------+
| Date   | Type      | Department           | Care Team | Description |
+--------+-----------+----------------------+-----------+-------------+
| / | Hospital  |   [facility] |           |             |
|    | Encounter |  [facility] EM[facility]Y   |           |             |
|        |           | CENTER  [address] |           |             |
|        |           |   [place]    |           |             |
|        |           | [address]           |           |             |
|        |           | [phone]         |           |             |
+--------+-----------+----------------------+-----------+-------------+
 
 
 
 Social History
 
 
+----------------+-------+-----------+--------+------+
| Tobacco Use    | Types | Packs/Day | Years  | Date |
|                |       |           | Used   |      |
+----------------+-------+-----------+--------+------+
| Never Assessed |       |           |        |      |
+----------------+-------+-----------+--------+------+
 
 
 
+------------------+---------------+
| Sex Assigned at  | Date Recorded |
| Birth            |               |
+------------------+---------------+
| Not on file      |               |
+------------------+---------------+
 
 
 
+----------------+-------------+-------------+
| Job Start Date | Occupation  | Industry    |
+----------------+-------------+-------------+
| Not on file    | Not on file | Not on file |
+----------------+-------------+-------------+
 
 
 
+----------------+--------------+------------+
| Travel History | Travel Start | Travel End |
+----------------+--------------+------------+
 
 
 
 
+-------------------------------------+
| No recent travel history available. |
+-------------------------------------+
 documented as of this encounter
 
 Plan of Treatment
 Not on filedocumented as of this encounter
 
 Visit Diagnoses
 Not on filedocumented in this encounter

## 2020-04-28 NOTE — XMS
Encounter Summary
  Created on: 2020
 
 Caverly, Paul Duane
 External Reference #: [id number]
 : 72
 Sex: Male
 
 Demographics
 
 
+-----------------------+-----------------------------+
| Address               | [address]                  |
|                       | [place]  [address] |
+-----------------------+-----------------------------+
| Home Phone            | [phone]             |
+-----------------------+-----------------------------+
| Preferred Language    | Unknown                     |
+-----------------------+-----------------------------+
| Marital Status        | Single                      |
+-----------------------+-----------------------------+
| Latter-day Affiliation | Unknown                     |
+-----------------------+-----------------------------+
| Race                  | Unknown                     |
+-----------------------+-----------------------------+
| Ethnic Group          | Unknown                     |
+-----------------------+-----------------------------+
 
 
 Author
 
 
+--------------+--------------------------------------------+
| Author       | [facility] [place]  |
|              | and [place]                                |
+--------------+--------------------------------------------+
| Organization | [facility] [place]  |
|              | and [place]                                |
+--------------+--------------------------------------------+
| Address      | Unknown                                    |
+--------------+--------------------------------------------+
| Phone        | Unavailable                                |
+--------------+--------------------------------------------+
 
 
 
 Support
 
 
+---------+--------------+---------+-----------------+
| Name    | Relationship | Address | Phone           |
+---------+--------------+---------+-----------------+
| Name No | ECON         | Unknown | [phone] |
+---------+--------------+---------+-----------------+
 
 
 
 Care Team Providers
 
 
 
+-----------------------+------+-------------+
| Care Team Member Name | Role | Phone       |
+-----------------------+------+-------------+
 PCP  | Unavailable |
+-----------------------+------+-------------+
 
 
 
 Encounter Details
 
 
+--------+-----------+----------------------+-----------+-------------+
| Date   | Type      | Department           | Care Team | Description |
+--------+-----------+----------------------+-----------+-------------+
| / | Hospital  |   [facility] |           |             |
|    | Encounter |  MED CTR XRAY  [address] |           |             |
|        |           |  [address]       |           |             |
|        |           | [place] [address] |           |             |
|        |           |   [phone]       |           |             |
+--------+-----------+----------------------+-----------+-------------+
 
 
 
 Social History
 
 
+----------------+-------+-----------+--------+------+
| Tobacco Use    | Types | Packs/Day | Years  | Date |
|                |       |           | Used   |      |
+----------------+-------+-----------+--------+------+
| Never Assessed |       |           |        |      |
+----------------+-------+-----------+--------+------+
 
 
 
+------------------+---------------+
| Sex Assigned at  | Date Recorded |
| Birth            |               |
+------------------+---------------+
| Not on file      |               |
+------------------+---------------+
 
 
 
+----------------+-------------+-------------+
| Job Start Date | Occupation  | Industry    |
+----------------+-------------+-------------+
| Not on file    | Not on file | Not on file |
+----------------+-------------+-------------+
 
 
 
+----------------+--------------+------------+
| Travel History | Travel Start | Travel End |
+----------------+--------------+------------+
 
 
 
 
+-------------------------------------+
| No recent travel history available. |
+-------------------------------------+
 documented as of this encounter
 
 Plan of Treatment
 Not on filedocumented as of this encounter
 
 Visit Diagnoses
 Not on filedocumented in this encounter

## 2020-04-28 NOTE — XMS
Encounter Summary
  Created on: 2020
 
 Caverly, Paul Duane
 External Reference #: [id number]
 : 72
 Sex: Male
 
 Demographics
 
 
+-----------------------+-----------------------------+
| Address               | [address]                  |
|                       | [place]  [address] |
+-----------------------+-----------------------------+
| Home Phone            | [phone]             |
+-----------------------+-----------------------------+
| Preferred Language    | Unknown                     |
+-----------------------+-----------------------------+
| Marital Status        | Single                      |
+-----------------------+-----------------------------+
| Spiritism Affiliation | Unknown                     |
+-----------------------+-----------------------------+
| Race                  | Unknown                     |
+-----------------------+-----------------------------+
| Ethnic Group          | Unknown                     |
+-----------------------+-----------------------------+
 
 
 Author
 
 
+--------------+--------------------------------------------+
| Author       | [facility] [place]  |
|              | and [place]                                |
+--------------+--------------------------------------------+
| Organization | [facility]  |
|              | and [place]                                |
+--------------+--------------------------------------------+
| Address      | Unknown                                    |
+--------------+--------------------------------------------+
| Phone        | Unavailable                                |
+--------------+--------------------------------------------+
 
 
 
 Support
 
 
+---------+--------------+---------+-----------------+
| Name    | Relationship | Address | Phone           |
+---------+--------------+---------+-----------------+
| Name No | ECON         | Unknown | [phone] |
+---------+--------------+---------+-----------------+
 
 
 
 Care Team Providers
 
 
 
+-----------------------+------+-------------+
| Care Team Member Name | Role | Phone       |
+-----------------------+------+-------------+
 PCP  | Unavailable |
+-----------------------+------+-------------+
 
 
 
 Encounter Details
 
 
+--------+-----------+----------------------+----------------------+-------------+
| Date   | Type      | Department           | Care Team            | Description |
+--------+-----------+----------------------+----------------------+-------------+
| / | Hospital  |   [facility] |   Estrell[facility] Hightower  |             |
|    | Encounter |  [facility] EM[facility]Y   | [facility]  |             |
|        |           | CENTER  [address]  [place],  |             |
|        |           |   [place]    | [place] [address]             |             |
|        |           | [address]           | [phone]         |             |
|        |           | [phone]         | [phone] (Fax)   |             |
+--------+-----------+----------------------+----------------------+-------------+
 
 
 
 Social History
 
 
+----------------+-------+-----------+--------+------+
| Tobacco Use    | Types | Packs/Day | Years  | Date |
|                |       |           | Used   |      |
+----------------+-------+-----------+--------+------+
| Never Assessed |       |           |        |      |
+----------------+-------+-----------+--------+------+
 
 
 
+------------------+---------------+
| Sex Assigned at  | Date Recorded |
| Birth            |               |
+------------------+---------------+
| Not on file      |               |
+------------------+---------------+
 
 
 
+----------------+-------------+-------------+
| Job Start Date | Occupation  | Industry    |
+----------------+-------------+-------------+
| Not on file    | Not on file | Not on file |
+----------------+-------------+-------------+
 
 
 
+----------------+--------------+------------+
| Travel History | Travel Start | Travel End |
+----------------+--------------+------------+
 
 
 
 
+-------------------------------------+
| No recent travel history available. |
+-------------------------------------+
 documented as of this encounter
 
 Plan of Treatment
 Not on filedocumented as of this encounter
 
 Visit Diagnoses
 Not on filedocumented in this encounter

## 2020-04-28 NOTE — XMS
Encounter Summary
  Created on: 2020
 
 Caverly, Paul Duane
 External Reference #: [id number]
 : 72
 Sex: Male
 
 Demographics
 
 
+-----------------------+-----------------------------+
| Address               | [address]                  |
|                       | [place]  [address] |
+-----------------------+-----------------------------+
| Home Phone            | [phone]             |
+-----------------------+-----------------------------+
| Preferred Language    | Unknown                     |
+-----------------------+-----------------------------+
| Marital Status        | Single                      |
+-----------------------+-----------------------------+
| Episcopal Affiliation | Unknown                     |
+-----------------------+-----------------------------+
| Race                  | Unknown                     |
+-----------------------+-----------------------------+
| Ethnic Group          | Unknown                     |
+-----------------------+-----------------------------+
 
 
 Author
 
 
+--------------+--------------------------------------------+
| Author       | [facility]  |
|              | and [place]                                |
+--------------+--------------------------------------------+
| Organization | [facility]  |
|              | and [place]                                |
+--------------+--------------------------------------------+
| Address      | Unknown                                    |
+--------------+--------------------------------------------+
| Phone        | Unavailable                                |
+--------------+--------------------------------------------+
 
 
 
 Support
 
 
+---------+--------------+---------+-----------------+
| Name    | Relationship | Address | Phone           |
+---------+--------------+---------+-----------------+
| Name No | ECON         | Unknown | [phone] |
+---------+--------------+---------+-----------------+
 
 
 
 Care Team Providers
 
 
 
+-----------------------+------+-------------+
| Care Team Member Name | Role | Phone       |
+-----------------------+------+-------------+
 PCP  | Unavailable |
+-----------------------+------+-------------+
 
 
 
 Encounter Details
 
 
+--------+-----------+----------------------+-----------+-------------+
| Date   | Type      | Department           | Care Team | Description |
+--------+-----------+----------------------+-----------+-------------+
| 05/10/ | Hospital  |   [facility] |           |             |
|    | Encounter |  MED CTR EMERGENCY   |           |             |
|        |           | CENTER  [address] |           |             |
|        |           |   [place]    |           |             |
|        |           | [address]           |           |             |
|        |           | [phone]         |           |             |
+--------+-----------+----------------------+-----------+-------------+
 
 
 
 Social History
 
 
+----------------+-------+-----------+--------+------+
| Tobacco Use    | Types | Packs/Day | Years  | Date |
|                |       |           | Used   |      |
+----------------+-------+-----------+--------+------+
| Never Assessed |       |           |        |      |
+----------------+-------+-----------+--------+------+
 
 
 
+------------------+---------------+
| Sex Assigned at  | Date Recorded |
| Birth            |               |
+------------------+---------------+
| Not on file      |               |
+------------------+---------------+
 
 
 
+----------------+-------------+-------------+
| Job Start Date | Occupation  | Industry    |
+----------------+-------------+-------------+
| Not on file    | Not on file | Not on file |
+----------------+-------------+-------------+
 
 
 
+----------------+--------------+------------+
| Travel History | Travel Start | Travel End |
+----------------+--------------+------------+
 
 
 
 
+-------------------------------------+
| No recent travel history available. |
+-------------------------------------+
 documented as of this encounter
 
 Plan of Treatment
 Not on filedocumented as of this encounter
 
 Visit Diagnoses
 Not on filedocumented in this encounter

## 2020-04-28 NOTE — XMS
Encounter Summary
  Created on: 2020
 
 Caverly, Paul Duane
 External Reference #: [id number]
 : 72
 Sex: Male
 
 Demographics
 
 
+-----------------------+-----------------------------+
| Address               | [address]                  |
|                       | [place]  [address] |
+-----------------------+-----------------------------+
| Home Phone            | [phone]             |
+-----------------------+-----------------------------+
| Preferred Language    | Unknown                     |
+-----------------------+-----------------------------+
| Marital Status        | Single                      |
+-----------------------+-----------------------------+
| Mormonism Affiliation | Unknown                     |
+-----------------------+-----------------------------+
| Race                  | Unknown                     |
+-----------------------+-----------------------------+
| Ethnic Group          | Unknown                     |
+-----------------------+-----------------------------+
 
 
 Author
 
 
+--------------+--------------------------------------------+
| Author       | [facility]  |
|              | and [place]                                |
+--------------+--------------------------------------------+
| Organization | [facility]  |
|              | and [place]                                |
+--------------+--------------------------------------------+
| Address      | Unknown                                    |
+--------------+--------------------------------------------+
| Phone        | Unavailable                                |
+--------------+--------------------------------------------+
 
 
 
 Support
 
 
+---------+--------------+---------+-----------------+
| Name    | Relationship | Address | Phone           |
+---------+--------------+---------+-----------------+
| Name No | ECON         | Unknown | [phone] |
+---------+--------------+---------+-----------------+
 
 
 
 Care Team Providers
 
 
 
+-----------------------+------+-------------+
| Care Team Member Name | Role | Phone       |
+-----------------------+------+-------------+
 PCP  | Unavailable |
+-----------------------+------+-------------+
 
 
 
 Encounter Details
 
 
+--------+-----------+----------------------+--------------------+-------------+
| Date   | Type      | Department           | Care Team          | Description |
+--------+-----------+----------------------+--------------------+-------------+
| 10/04/ | [facility]  |   [facility] |   Parvez Vizcaino   |             |
|    | Encounter |  MED CTR XRAY  [address] | [address]    |             |
|        |           |  [address]       | [address]    |             |
|        |           | [place] [address] | [place] [address]    |             |
|        |           |   [phone]       | [phone]       |             |
|        |           |                      | [phone] (Fax) |             |
+--------+-----------+----------------------+--------------------+-------------+
 
 
 
 Social History
 
 
+----------------+-------+-----------+--------+------+
| Tobacco Use    | Types | Packs/Day | Years  | Date |
|                |       |           | Used   |      |
+----------------+-------+-----------+--------+------+
| Never Assessed |       |           |        |      |
+----------------+-------+-----------+--------+------+
 
 
 
+------------------+---------------+
| Sex Assigned at  | Date Recorded |
| Birth            |               |
+------------------+---------------+
| Not on file      |               |
+------------------+---------------+
 
 
 
+----------------+-------------+-------------+
| Job Start Date | Occupation  | Industry    |
+----------------+-------------+-------------+
| Not on file    | Not on file | Not on file |
+----------------+-------------+-------------+
 
 
 
+----------------+--------------+------------+
| Travel History | Travel Start | Travel End |
+----------------+--------------+------------+
 
 
 
 
+-------------------------------------+
| No recent travel history available. |
+-------------------------------------+
 documented as of this encounter
 
 Plan of Treatment
 Not on filedocumented as of this encounter
 
 Visit Diagnoses
 Not on filedocumented in this encounter

## 2020-04-28 NOTE — XMS
Encounter Summary
  Created on: 2020
 
 Caverly, Paul Duane
 External Reference #: [id number]
 : 72
 Sex: Male
 
 Demographics
 
 
+-----------------------+-----------------------------+
| Address               | [address]                  |
|                       | [place]  [address] |
+-----------------------+-----------------------------+
| Home Phone            | [phone]             |
+-----------------------+-----------------------------+
| Preferred Language    | Unknown                     |
+-----------------------+-----------------------------+
| Marital Status        | Single                      |
+-----------------------+-----------------------------+
| Mosque Affiliation | Unknown                     |
+-----------------------+-----------------------------+
| Race                  | Unknown                     |
+-----------------------+-----------------------------+
| Ethnic Group          | Unknown                     |
+-----------------------+-----------------------------+
 
 
 Author
 
 
+--------------+--------------------------------------------+
| Author       | [facility]  |
|              | and [place]                                |
+--------------+--------------------------------------------+
| Organization | [facility]  |
|              | and [place]                                |
+--------------+--------------------------------------------+
| Address      | Unknown                                    |
+--------------+--------------------------------------------+
| Phone        | Unavailable                                |
+--------------+--------------------------------------------+
 
 
 
 Support
 
 
+---------+--------------+---------+-----------------+
| Name    | Relationship | Address | Phone           |
+---------+--------------+---------+-----------------+
| Name No | ECON         | Unknown | [phone] |
+---------+--------------+---------+-----------------+
 
 
 
 Care Team Providers
 
 
 
+-----------------------+------+-------------+
| Care Team Member Name | Role | Phone       |
+-----------------------+------+-------------+
 PCP  | Unavailable |
+-----------------------+------+-------------+
 
 
 
 Encounter Details
 
 
+--------+-----------+----------------------+----------------------+-------------+
| Date   | Type      | Department           | Care Team            | Description |
+--------+-----------+----------------------+----------------------+-------------+
| / | Hospital  |   [facility] |   Jada,       |             |
|    | Encounter |  MED CTR EMERGENCY   | Jerzy [facility] MD  [address]  |             |
|        |           | CENTER  [address] | [place]     |             |
|        |           |   [place]    | [place] [address] |             |
|        |           | [address]           |   [phone]       |             |
|        |           | [phone]         | [phone] (Fax)   |             |
+--------+-----------+----------------------+----------------------+-------------+
 
 
 
 Social History
 
 
+----------------+-------+-----------+--------+------+
| Tobacco Use    | Types | Packs/Day | Years  | Date |
|                |       |           | Used   |      |
+----------------+-------+-----------+--------+------+
| Never Assessed |       |           |        |      |
+----------------+-------+-----------+--------+------+
 
 
 
+------------------+---------------+
| Sex Assigned at  | Date Recorded |
| Birth            |               |
+------------------+---------------+
| Not on file      |               |
+------------------+---------------+
 
 
 
+----------------+-------------+-------------+
| Job Start Date | Occupation  | Industry    |
+----------------+-------------+-------------+
| Not on file    | Not on file | Not on file |
+----------------+-------------+-------------+
 
 
 
+----------------+--------------+------------+
| Travel History | Travel Start | Travel End |
+----------------+--------------+------------+
 
 
 
 
+-------------------------------------+
| No recent travel history available. |
+-------------------------------------+
 documented as of this encounter
 
 Plan of Treatment
 Not on filedocumented as of this encounter
 
 Visit Diagnoses
 Not on filedocumented in this encounter

## 2020-04-28 NOTE — XMS
Encounter Summary
  Created on: 2020
 
 Caverly, Paul Duane
 External Reference #: [id number]
 : 72
 Sex: Male
 
 Demographics
 
 
+-----------------------+-----------------------------+
| Address               | [address]                  |
|                       | [place]  [address] |
+-----------------------+-----------------------------+
| Home Phone            | [phone]             |
+-----------------------+-----------------------------+
| Preferred Language    | Unknown                     |
+-----------------------+-----------------------------+
| Marital Status        | Single                      |
+-----------------------+-----------------------------+
| Amish Affiliation | Unknown                     |
+-----------------------+-----------------------------+
| Race                  | Unknown                     |
+-----------------------+-----------------------------+
| Ethnic Group          | Unknown                     |
+-----------------------+-----------------------------+
 
 
 Author
 
 
+--------------+--------------------------------------------+
| Author       | [facility]  |
|              | and [place]                                |
+--------------+--------------------------------------------+
| Organization | [facility]  |
|              | and [place]                                |
+--------------+--------------------------------------------+
| Address      | Unknown                                    |
+--------------+--------------------------------------------+
| Phone        | Unavailable                                |
+--------------+--------------------------------------------+
 
 
 
 Support
 
 
+---------+--------------+---------+-----------------+
| Name    | Relationship | Address | Phone           |
+---------+--------------+---------+-----------------+
| Name No | ECON         | Unknown | [phone] |
+---------+--------------+---------+-----------------+
 
 
 
 Care Team Providers
 
 
 
+-----------------------+------+-------------+
| Care Team Member Name | Role | Phone       |
+-----------------------+------+-------------+
 PCP  | Unavailable |
+-----------------------+------+-------------+
 
 
 
 Encounter Details
 
 
+--------+-----------+----------------------+----------------------+-------------+
| Date   | Type      | Department           | Care Team            | Description |
+--------+-----------+----------------------+----------------------+-------------+
| / | Hospital  |   [facility] |   Estrellita Hightower  |             |
|    | Encounter |  MED CTR EMERGENCY   | MD Christine [address]ONG  |             |
|        |           | CENTER  [address] | [address]  [place],  |             |
|        |           |   [place]    | [place] [address]             |             |
|        |           | [address]           | [phone]         |             |
|        |           | [phone]         | [phone] (Fax)   |             |
+--------+-----------+----------------------+----------------------+-------------+
 
 
 
 Social History
 
 
+----------------+-------+-----------+--------+------+
| Tobacco Use    | Types | Packs/Day | Years  | Date |
|                |       |           | Used   |      |
+----------------+-------+-----------+--------+------+
| Never Assessed |       |           |        |      |
+----------------+-------+-----------+--------+------+
 
 
 
+------------------+---------------+
| Sex Assigned at  | Date Recorded |
| Birth            |               |
+------------------+---------------+
| Not on file      |               |
+------------------+---------------+
 
 
 
+----------------+-------------+-------------+
| Job Start Date | Occupation  | Industry    |
+----------------+-------------+-------------+
| Not on file    | Not on file | Not on file |
+----------------+-------------+-------------+
 
 
 
+----------------+--------------+------------+
| Travel History | Travel Start | Travel End |
+----------------+--------------+------------+
 
 
 
 
+-------------------------------------+
| No recent travel history available. |
+-------------------------------------+
 documented as of this encounter
 
 Plan of Treatment
 Not on filedocumented as of this encounter
 
 Visit Diagnoses
 Not on filedocumented in this encounter

## 2020-04-28 NOTE — XMS
Encounter Summary
  Created on: 2020
 
 Caverly, Paul Duane
 External Reference #: [id number]
 : 72
 Sex: Male
 
 Demographics
 
 
+-----------------------+-----------------------------+
| Address               | [address]                  |
|                       | [place]  [address] |
+-----------------------+-----------------------------+
| Home Phone            | [phone]             |
+-----------------------+-----------------------------+
| Preferred Language    | Unknown                     |
+-----------------------+-----------------------------+
| Marital Status        | Single                      |
+-----------------------+-----------------------------+
| Mu-ism Affiliation | Unknown                     |
+-----------------------+-----------------------------+
| Race                  | Unknown                     |
+-----------------------+-----------------------------+
| Ethnic Group          | Unknown                     |
+-----------------------+-----------------------------+
 
 
 Author
 
 
+--------------+--------------------------------------------+
| Author       | [facility]  |
|              | and [place]                                |
+--------------+--------------------------------------------+
| Organization | [facility]  |
|              | and [place]                                |
+--------------+--------------------------------------------+
| Address      | Unknown                                    |
+--------------+--------------------------------------------+
| Phone        | Unavailable                                |
+--------------+--------------------------------------------+
 
 
 
 Support
 
 
+---------+--------------+---------+-----------------+
| Name    | Relationship | Address | Phone           |
+---------+--------------+---------+-----------------+
| Name No | ECON         | Unknown | [phone] |
+---------+--------------+---------+-----------------+
 
 
 
 Care Team Providers
 
 
 
+-----------------------+------+-------------+
| Care Team Member Name | Role | Phone       |
+-----------------------+------+-------------+
 PCP  | Unavailable |
+-----------------------+------+-------------+
 
 
 
 Encounter Details
 
 
+--------+-----------+----------------------+----------------------+-------------+
| Date   | Type      | Department           | Care Team            | Description |
+--------+-----------+----------------------+----------------------+-------------+
| / | Hospital  |   [facility] |   Estrellita Hightower  |             |
|    | Encounter |  MED CTR EMERGENCY   | MD Christine [address]ONG  |             |
|        |           | CENTER  [address] | [address]  [place],  |             |
|        |           |   [place]    | [place] [address]             |             |
|        |           | [address]           | [phone]         |             |
|        |           | [phone]         | [phone] (Fax)   |             |
+--------+-----------+----------------------+----------------------+-------------+
 
 
 
 Social History
 
 
+----------------+-------+-----------+--------+------+
| Tobacco Use    | Types | Packs/Day | Years  | Date |
|                |       |           | Used   |      |
+----------------+-------+-----------+--------+------+
| Never Assessed |       |           |        |      |
+----------------+-------+-----------+--------+------+
 
 
 
+------------------+---------------+
| Sex Assigned at  | Date Recorded |
| Birth            |               |
+------------------+---------------+
| Not on file      |               |
+------------------+---------------+
 
 
 
+----------------+-------------+-------------+
| Job Start Date | Occupation  | Industry    |
+----------------+-------------+-------------+
| Not on file    | Not on file | Not on file |
+----------------+-------------+-------------+
 
 
 
+----------------+--------------+------------+
| Travel History | Travel Start | Travel End |
+----------------+--------------+------------+
 
 
 
 
+-------------------------------------+
| No recent travel history available. |
+-------------------------------------+
 documented as of this encounter
 
 Plan of Treatment
 Not on filedocumented as of this encounter
 
 Visit Diagnoses
 Not on filedocumented in this encounter

## 2020-04-28 NOTE — XMS
Encounter Summary
  Created on: 2020
 
 Caverly, Paul Duane
 External Reference #: [id number]
 : 72
 Sex: Male
 
 Demographics
 
 
+-----------------------+-----------------------------+
| Address               | [address]                  |
|                       | [place]  [address] |
+-----------------------+-----------------------------+
| Home Phone            | [phone]             |
+-----------------------+-----------------------------+
| Preferred Language    | Unknown                     |
+-----------------------+-----------------------------+
| Marital Status        | Single                      |
+-----------------------+-----------------------------+
| Amish Affiliation | Unknown                     |
+-----------------------+-----------------------------+
| Race                  | Unknown                     |
+-----------------------+-----------------------------+
| Ethnic Group          | Unknown                     |
+-----------------------+-----------------------------+
 
 
 Author
 
 
+--------------+--------------------------------------------+
| Author       | [facility]  |
|              | and [place]                                |
+--------------+--------------------------------------------+
| Organization | [facility]  |
|              | and [place]                                |
+--------------+--------------------------------------------+
| Address      | Unknown                                    |
+--------------+--------------------------------------------+
| Phone        | Unavailable                                |
+--------------+--------------------------------------------+
 
 
 
 Support
 
 
+---------+--------------+---------+-----------------+
| Name    | Relationship | Address | Phone           |
+---------+--------------+---------+-----------------+
| Name No | ECON         | Unknown | [phone] |
+---------+--------------+---------+-----------------+
 
 
 
 Care Team Providers
 
 
 
+-----------------------+------+-------------+
| Care Team Member Name | Role | Phone       |
+-----------------------+------+-------------+
 PCP  | Unavailable |
+-----------------------+------+-------------+
 
 
 
 Encounter Details
 
 
+--------+-----------+----------------------+---------------------+-------------+
| Date   | Type      | Department           | Care Team           | Description |
+--------+-----------+----------------------+---------------------+-------------+
| / | Hospital  |   [facility] |   Obed Hodge  |             |
|    | Encounter |  MED CTR EMERGENCY   | MD KAMIL[address]    |             |
|        |           | CENTER  [address] | [place]     |             |
|        |           |   [place]    | [place] [address]     |             |
|        |           | [address]           | [phone]        |             |
|        |           | [phone]         | [phone] (Fax)  |             |
+--------+-----------+----------------------+---------------------+-------------+
 
 
 
 Social History
 
 
+----------------+-------+-----------+--------+------+
| Tobacco Use    | Types | Packs/Day | Years  | Date |
|                |       |           | Used   |      |
+----------------+-------+-----------+--------+------+
| Never Assessed |       |           |        |      |
+----------------+-------+-----------+--------+------+
 
 
 
+------------------+---------------+
| Sex Assigned at  | Date Recorded |
| Birth            |               |
+------------------+---------------+
| Not on file      |               |
+------------------+---------------+
 
 
 
+----------------+-------------+-------------+
| Job Start Date | Occupation  | Industry    |
+----------------+-------------+-------------+
| Not on file    | Not on file | Not on file |
+----------------+-------------+-------------+
 
 
 
+----------------+--------------+------------+
| Travel History | Travel Start | Travel End |
+----------------+--------------+------------+
 
 
 
 
+-------------------------------------+
| No recent travel history available. |
+-------------------------------------+
 documented as of this encounter
 
 Plan of Treatment
 Not on filedocumented as of this encounter
 
 Visit Diagnoses
 Not on filedocumented in this encounter

## 2020-04-28 NOTE — XMS
Encounter Summary
  Created on: 2020
 
 Caverly, Paul Duane
 External Reference #: [id number]
 : 72
 Sex: Male
 
 Demographics
 
 
+-----------------------+-----------------------------+
| Address               | [address]                  |
|                       | [place]  [address] |
+-----------------------+-----------------------------+
| Home Phone            | [phone]             |
+-----------------------+-----------------------------+
| Preferred Language    | Unknown                     |
+-----------------------+-----------------------------+
| Marital Status        | Single                      |
+-----------------------+-----------------------------+
| Nondenominational Affiliation | Unknown                     |
+-----------------------+-----------------------------+
| Race                  | Unknown                     |
+-----------------------+-----------------------------+
| Ethnic Group          | Unknown                     |
+-----------------------+-----------------------------+
 
 
 Author
 
 
+--------------+--------------------------------------------+
| Author       | [facility]  |
|              | and [place]                                |
+--------------+--------------------------------------------+
| Organization | [facility]  |
|              | and [place]                                |
+--------------+--------------------------------------------+
| Address      | Unknown                                    |
+--------------+--------------------------------------------+
| Phone        | Unavailable                                |
+--------------+--------------------------------------------+
 
 
 
 Support
 
 
+---------+--------------+---------+-----------------+
| Name    | Relationship | Address | Phone           |
+---------+--------------+---------+-----------------+
| Name No | ECON         | Unknown | [phone] |
+---------+--------------+---------+-----------------+
 
 
 
 Care Team Providers
 
 
 
+-----------------------+------+-------------+
| Care Team Member Name | Role | Phone       |
+-----------------------+------+-------------+
 PCP  | Unavailable |
+-----------------------+------+-------------+
 
 
 
 Encounter Details
 
 
+--------+-----------+----------------------+----------------------+-------------+
| Date   | Type      | Department           | Care Team            | Description |
+--------+-----------+----------------------+----------------------+-------------+
| / | Hospital  |   [facility] |   Estrellita Hightower  |             |
|    | Encounter |  MED CTR EMERGENCY   | MD Christine [address]ONG  |             |
|        |           | CENTER  [address] | [address]  [place],  |             |
|        |           |   [place]    | [place] [address]             |             |
|        |           | [address]           | [phone]         |             |
|        |           | [phone]         | [phone] (Fax)   |             |
+--------+-----------+----------------------+----------------------+-------------+
 
 
 
 Social History
 
 
+----------------+-------+-----------+--------+------+
| Tobacco Use    | Types | Packs/Day | Years  | Date |
|                |       |           | Used   |      |
+----------------+-------+-----------+--------+------+
| Never Assessed |       |           |        |      |
+----------------+-------+-----------+--------+------+
 
 
 
+------------------+---------------+
| Sex Assigned at  | Date Recorded |
| Birth            |               |
+------------------+---------------+
| Not on file      |               |
+------------------+---------------+
 
 
 
+----------------+-------------+-------------+
| Job Start Date | Occupation  | Industry    |
+----------------+-------------+-------------+
| Not on file    | Not on file | Not on file |
+----------------+-------------+-------------+
 
 
 
+----------------+--------------+------------+
| Travel History | Travel Start | Travel End |
+----------------+--------------+------------+
 
 
 
 
+-------------------------------------+
| No recent travel history available. |
+-------------------------------------+
 documented as of this encounter
 
 Plan of Treatment
 Not on filedocumented as of this encounter
 
 Visit Diagnoses
 Not on filedocumented in this encounter

## 2020-04-28 NOTE — XMS
Encounter Summary
  Created on: 2020
 
 Caverly, Paul Duane
 External Reference #: [id number]
 : 72
 Sex: Male
 
 Demographics
 
 
+-----------------------+-----------------------------+
| Address               | [address]                  |
|                       | [place]  [address] |
+-----------------------+-----------------------------+
| Home Phone            | [phone]             |
+-----------------------+-----------------------------+
| Preferred Language    | Unknown                     |
+-----------------------+-----------------------------+
| Marital Status        | Single                      |
+-----------------------+-----------------------------+
| Sabianism Affiliation | Unknown                     |
+-----------------------+-----------------------------+
| Race                  | Unknown                     |
+-----------------------+-----------------------------+
| Ethnic Group          | Unknown                     |
+-----------------------+-----------------------------+
 
 
 Author
 
 
+--------------+--------------------------------------------+
| Author       | [facility]  |
|              | and [place]                                |
+--------------+--------------------------------------------+
| Organization | [facility]  |
|              | and [place]                                |
+--------------+--------------------------------------------+
| Address      | Unknown                                    |
+--------------+--------------------------------------------+
| Phone        | Unavailable                                |
+--------------+--------------------------------------------+
 
 
 
 Support
 
 
+---------+--------------+---------+-----------------+
| Name    | Relationship | Address | Phone           |
+---------+--------------+---------+-----------------+
| Name No | ECON         | Unknown | [phone] |
+---------+--------------+---------+-----------------+
 
 
 
 Care Team Providers
 
 
 
+-----------------------+------+-------------+
| Care Team Member Name | Role | Phone       |
+-----------------------+------+-------------+
 PCP  | Unavailable |
+-----------------------+------+-------------+
 
 
 
 Encounter Details
 
 
+--------+-----------+----------------------+---------------------+-------------+
| Date   | Type      | Department           | Care Team           | Description |
+--------+-----------+----------------------+---------------------+-------------+
| / | [facility]  |   [facility] |   Julia Vizcaino  |             |
|    | Encounter |  MED CTR XRAY  [address] | [address]     |             |
|        |           |  [address]       | [address]   |             |
|        |           | [place] [address] | [place] [address]     |             |
|        |           |   [phone]       | [phone]        |             |
|        |           |                      | [phone] (Fax)  |             |
+--------+-----------+----------------------+---------------------+-------------+
 
 
 
 Social History
 
 
+----------------+-------+-----------+--------+------+
| Tobacco Use    | Types | Packs/Day | Years  | Date |
|                |       |           | Used   |      |
+----------------+-------+-----------+--------+------+
| Never Assessed |       |           |        |      |
+----------------+-------+-----------+--------+------+
 
 
 
+------------------+---------------+
| Sex Assigned at  | Date Recorded |
| Birth            |               |
+------------------+---------------+
| Not on file      |               |
+------------------+---------------+
 
 
 
+----------------+-------------+-------------+
| Job Start Date | Occupation  | Industry    |
+----------------+-------------+-------------+
| Not on file    | Not on file | Not on file |
+----------------+-------------+-------------+
 
 
 
+----------------+--------------+------------+
| Travel History | Travel Start | Travel End |
+----------------+--------------+------------+
 
 
 
 
+-------------------------------------+
| No recent travel history available. |
+-------------------------------------+
 documented as of this encounter
 
 Plan of Treatment
 Not on filedocumented as of this encounter
 
 Visit Diagnoses
 Not on filedocumented in this encounter

## 2020-04-28 NOTE — XMS
Encounter Summary
  Created on: 2020
 
 Caverly, Paul Duane
 External Reference #: [id number]
 : 72
 Sex: Male
 
 Demographics
 
 
+-----------------------+-----------------------------+
| Address               | [address]                  |
|                       | [place]  [address] |
+-----------------------+-----------------------------+
| Home Phone            | [phone]             |
+-----------------------+-----------------------------+
| Preferred Language    | Unknown                     |
+-----------------------+-----------------------------+
| Marital Status        | Single                      |
+-----------------------+-----------------------------+
| Samaritan Affiliation | Unknown                     |
+-----------------------+-----------------------------+
| Race                  | Unknown                     |
+-----------------------+-----------------------------+
| Ethnic Group          | Unknown                     |
+-----------------------+-----------------------------+
 
 
 Author
 
 
+--------------+--------------------------------------------+
| Author       | [facility]  |
|              | and [place]                                |
+--------------+--------------------------------------------+
| Organization | [facility]  |
|              | and [place]                                |
+--------------+--------------------------------------------+
| Address      | Unknown                                    |
+--------------+--------------------------------------------+
| Phone        | Unavailable                                |
+--------------+--------------------------------------------+
 
 
 
 Support
 
 
+---------+--------------+---------+-----------------+
| Name    | Relationship | Address | Phone           |
+---------+--------------+---------+-----------------+
| Name No | ECON         | Unknown | [phone] |
+---------+--------------+---------+-----------------+
 
 
 
 Care Team Providers
 
 
 
+-----------------------+------+-------------+
| Care Team Member Name | Role | Phone       |
+-----------------------+------+-------------+
 PCP  | Unavailable |
+-----------------------+------+-------------+
 
 
 
 Encounter Details
 
 
+--------+-----------+----------------------+-----------+-------------+
| Date   | Type      | Department           | Care Team | Description |
+--------+-----------+----------------------+-----------+-------------+
| / | Hospital  |   [facility] |           |             |
|    | Encounter |  MED CTR EMERGENCY   |           |             |
|        |           | CENTER  [address] |           |             |
|        |           |   [place]    |           |             |
|        |           | [address]           |           |             |
|        |           | [phone]         |           |             |
+--------+-----------+----------------------+-----------+-------------+
 
 
 
 Social History
 
 
+----------------+-------+-----------+--------+------+
| Tobacco Use    | Types | Packs/Day | Years  | Date |
|                |       |           | Used   |      |
+----------------+-------+-----------+--------+------+
| Never Assessed |       |           |        |      |
+----------------+-------+-----------+--------+------+
 
 
 
+------------------+---------------+
| Sex Assigned at  | Date Recorded |
| Birth            |               |
+------------------+---------------+
| Not on file      |               |
+------------------+---------------+
 
 
 
+----------------+-------------+-------------+
| Job Start Date | Occupation  | Industry    |
+----------------+-------------+-------------+
| Not on file    | Not on file | Not on file |
+----------------+-------------+-------------+
 
 
 
+----------------+--------------+------------+
| Travel History | Travel Start | Travel End |
+----------------+--------------+------------+
 
 
 
 
+-------------------------------------+
| No recent travel history available. |
+-------------------------------------+
 documented as of this encounter
 
 Plan of Treatment
 Not on filedocumented as of this encounter
 
 Visit Diagnoses
 Not on filedocumented in this encounter

## 2020-04-28 NOTE — XMS
Encounter Summary
  Created on: 2020
 
 Caverly, Paul Duane
 External Reference #: [id number]
 : 72
 Sex: Male
 
 Demographics
 
 
+-----------------------+-----------------------------+
| Address               | [address]                  |
|                       | [place]  [address] |
+-----------------------+-----------------------------+
| Home Phone            | [phone]             |
+-----------------------+-----------------------------+
| Preferred Language    | Unknown                     |
+-----------------------+-----------------------------+
| Marital Status        | Single                      |
+-----------------------+-----------------------------+
| Evangelical Affiliation | Unknown                     |
+-----------------------+-----------------------------+
| Race                  | Unknown                     |
+-----------------------+-----------------------------+
| Ethnic Group          | Unknown                     |
+-----------------------+-----------------------------+
 
 
 Author
 
 
+--------------+--------------------------------------------+
| Author       | [facility]  |
|              | and [place]                                |
+--------------+--------------------------------------------+
| Organization | [facility] [place]  |
|              | and [place]                                |
+--------------+--------------------------------------------+
| Address      | Unknown                                    |
+--------------+--------------------------------------------+
| Phone        | Unavailable                                |
+--------------+--------------------------------------------+
 
 
 
 Support
 
 
+---------+--------------+---------+-----------------+
| Name    | Relationship | Address | Phone           |
+---------+--------------+---------+-----------------+
| Name No | ECON         | Unknown | [phone] |
+---------+--------------+---------+-----------------+
 
 
 
 Care Team Providers
 
 
 
+-----------------------+------+-------------+
| Care Team Member Name | Role | Phone       |
+-----------------------+------+-------------+
 PCP  | Unavailable |
+-----------------------+------+-------------+
 
 
 
 Encounter Details
 
 
+--------+-----------+----------------------+---------------------+-------------+
| Date   | Type      | Department           | Care Team           | Description |
+--------+-----------+----------------------+---------------------+-------------+
| / | [facility]  |   [facility] |   Julia Vizcaino  |             |
|    | Encounter |  MED CTR XRAY  [address] | [address]  [address]     |             |
|        |           |  [address]       | [address]  [place]   |             |
|        |           | [place] [address] | [place] [address]     |             |
|        |           |   [phone]       | [phone]        |             |
|        |           |                      | [phone] (Fax)  |             |
+--------+-----------+----------------------+---------------------+-------------+
 
 
 
 Social History
 
 
+----------------+-------+-----------+--------+------+
| Tobacco Use    | Types | Packs/Day | Years  | Date |
|                |       |           | Used   |      |
+----------------+-------+-----------+--------+------+
| Never Assessed |       |           |        |      |
+----------------+-------+-----------+--------+------+
 
 
 
+------------------+---------------+
| Sex Assigned at  | Date Recorded |
| Birth            |               |
+------------------+---------------+
| Not on file      |               |
+------------------+---------------+
 
 
 
+----------------+-------------+-------------+
| Job Start Date | Occupation  | Industry    |
+----------------+-------------+-------------+
| Not on file    | Not on file | Not on file |
+----------------+-------------+-------------+
 
 
 
+----------------+--------------+------------+
| Travel History | Travel Start | Travel End |
+----------------+--------------+------------+
 
 
 
 
+-------------------------------------+
| No recent travel history available. |
+-------------------------------------+
 documented as of this encounter
 
 Plan of Treatment
 Not on filedocumented as of this encounter
 
 Visit Diagnoses
 Not on filedocumented in this encounter

## 2020-04-28 NOTE — XMS
Encounter Summary
  Created on: 2020
 
 Caverly, Paul Duane
 External Reference #: [id number]
 : 72
 Sex: Male
 
 Demographics
 
 
+-----------------------+-----------------------------+
| Address               | [address]                  |
|                       | [place]  [address] |
+-----------------------+-----------------------------+
| Home Phone            | [phone]             |
+-----------------------+-----------------------------+
| Preferred Language    | Unknown                     |
+-----------------------+-----------------------------+
| Marital Status        | Single                      |
+-----------------------+-----------------------------+
| Cheondoism Affiliation | Unknown                     |
+-----------------------+-----------------------------+
| Race                  | Unknown                     |
+-----------------------+-----------------------------+
| Ethnic Group          | Unknown                     |
+-----------------------+-----------------------------+
 
 
 Author
 
 
+--------------+--------------------------------------------+
| Author       | [facility]  |
|              | and [place]                                |
+--------------+--------------------------------------------+
| Organization | [facility]  |
|              | and [place]                                |
+--------------+--------------------------------------------+
| Address      | Unknown                                    |
+--------------+--------------------------------------------+
| Phone        | Unavailable                                |
+--------------+--------------------------------------------+
 
 
 
 Support
 
 
+---------+--------------+---------+-----------------+
| Name    | Relationship | Address | Phone           |
+---------+--------------+---------+-----------------+
| Name No | ECON         | Unknown | [phone] |
+---------+--------------+---------+-----------------+
 
 
 
 Care Team Providers
 
 
 
+-----------------------+------+-------------+
| Care Team Member Name | Role | Phone       |
+-----------------------+------+-------------+
| No, Physician         | PCP  | Unavailable |
+-----------------------+------+-------------+
 
 
 
 Reason for Visit
 
 
+---------------+-----------------------------+
| Reason        | Comments                    |
+---------------+-----------------------------+
| Hand Swelling | room 2/ right hand x 3 days |
+---------------+-----------------------------+
 
 
 
 Encounter Details
 
 
+--------+---------+----------------------+----------------------+----------------------+
| Date   | Type    | Department           | Care Team            | Description          |
+--------+---------+----------------------+----------------------+----------------------+
| / | Office  |   [facility]   |   Damian Vidal | Cellulitis of right  |
| 2018   | Visit   | CARE  [address] |  MD [place]OTHY  [address]   | hand (Primary Dx)    |
|        |         |   [place]    | [address]  [place] |                      |
|        |         | [id number]           |  [address]  [phone] |                      |
|        |         | [phone]         |   [phone] (Fax) |                      |
+--------+---------+----------------------+----------------------+----------------------+
 
 
 
 Social History
 
 
+--------------------+-------+-----------+--------+------+
| Tobacco Use        | Types | Packs/Day | Years  | Date |
|                    |       |           | Used   |      |
+--------------------+-------+-----------+--------+------+
| Current Every Day  |       |           |        |      |
| Smoker             |       |           |        |      |
+--------------------+-------+-----------+--------+------+
 
 
 
+---------------------+---+---+---+
| Smokeless Tobacco:  |   |   |   |
| Never Used          |   |   |   |
+---------------------+---+---+---+
 
 
 
+------------------+---------------+
| Sex Assigned at  | Date Recorded |
| Birth            |               |
+------------------+---------------+
 
| Not on file      |               |
+------------------+---------------+
 
 
 
+----------------+-------------+-------------+
| Job Start Date | Occupation  | Industry    |
+----------------+-------------+-------------+
| Not on file    | Not on file | Not on file |
+----------------+-------------+-------------+
 
 
 
+----------------+--------------+------------+
| Travel History | Travel Start | Travel End |
+----------------+--------------+------------+
 
 
 
+-------------------------------------+
| No recent travel history available. |
+-------------------------------------+
 documented as of this encounter
 
 Last Filed Vital Signs
 
 
+-------------------+----------------------+----------------------+----------+
| Vital Sign        | Reading              | Time Taken           | Comments |
+-------------------+----------------------+----------------------+----------+
| Blood Pressure    | 124/84               | 2018  1:11 PM  |          |
|                   |                      | PST                  |          |
+-------------------+----------------------+----------------------+----------+
| Pulse             | 103                  | 2018  1:11 PM  |          |
|                   |                      | PST                  |          |
+-------------------+----------------------+----------------------+----------+
| Temperature       | 36.3   C (97.4   F)  | 2018  1:11 PM  |          |
|                   |                      | PST                  |          |
+-------------------+----------------------+----------------------+----------+
| Respiratory Rate  | 16                   | 2018  1:11 PM  |          |
|                   |                      | PST                  |          |
+-------------------+----------------------+----------------------+----------+
| Oxygen Saturation | 99%                  | 2018  1:11 PM  |          |
|                   |                      | PST                  |          |
+-------------------+----------------------+----------------------+----------+
| Inhaled Oxygen    | -                    | -                    |          |
| Concentration     |                      |                      |          |
+-------------------+----------------------+----------------------+----------+
| Weight            | 87.3 kg (192 lb 7.4  | 2018  1:11 PM  |          |
|                   | oz)                  | PST                  |          |
+-------------------+----------------------+----------------------+----------+
| Height            | 185.4 cm (6' 1")     | 2018  1:11 PM  |          |
|                   |                      | PST                  |          |
+-------------------+----------------------+----------------------+----------+
| Body Mass Index   | 25.39                | 2018  1:11 PM  |          |
|                   |                      | PST                  |          |
+-------------------+----------------------+----------------------+----------+
 documented in this encounter
 
 Patient Instructions
 
 Patient Instructions Damian Vidal MD - 2018 12:45 PM PSTRocephin shot was pr
ovided for infection.
 Take Keflex 500 mg 4 times daily for 10 days for skin infection.
 If you develop an itchy rash, hives, lip swelling or wheezing, stop Keflex and report to [facility] emergency room.
 Take Naprosyn 500 mg, 1 tablet with food twice daily for 5 days, then as needed for pain an
d swelling.
 Take tramadol 50 mg, one tablet up to 3 times daily as needed for breakthrough pain.
 The right hand and arm above the level of your heart as needed for pain and swelling.
 Use warm, moist compresses on the hand and forearm for 15 minutes at a time 3 times daily u
ntil improved.
 Stay home from work avoiding repetitive gripping, grasping and lifting with the right hand 
until symptoms have resolved.
 Return for reevaluation of your hand and forearm tomorrow.
 Report to emergency room if he developed progressive pain, redness, swelling, nausea, vomit
ing or other new rapidly progressive associated symptoms.
 
 Cellulitis
 Cellulitis is an infection of the deep layers of skin. A break in the skin, such as a cut o
r scratch, can let bacteria under the skin. If the bacteria get to deep layers of the skin, 
it can be serious. If not treated, cellulitis can get into the bloodstream and lymph nodes. 
The infection can then spread throughout the body. This causes serious illness.
 Cellulitis causes the affected skin to become red, swollen, warm, and sore. The reddened ar
eas have a visible border. An open sore may leak fluid (pus). You may have a fever, chills, 
and pain.
 Cellulitis is treated with antibiotics taken for 7 to 10 days. An open sore may be cleaned 
and covered with cool wet gauze. Symptoms should get better 1 to 2 days after treatment is s
tarted. Make sure to take all the antibiotics for the full number of days until they are apryl
e. Keep taking the medicine even if your symptoms go away.
 Home care
 Follow these tips:
  Limit the use of the part of your body with cellulitis.
  If the infection is on your leg, keep your leg raised while sitting. This will help to r
educe swelling.
  Take all of the antibiotic medicine exactly as directed until it is gone. Do not miss an
y doses, especially during the first 7 days. Don  t stop taking the medicine when your symp
toms get better.
  Keep the affected area clean and dry.
  Wash your hands with soap and warm water before and after touching your skin. Anyone els
e who touches your skin should also wash his or her hands. Don't share towels.
 Follow-up care
 Follow up with your healthcare provider, or as advised. If your infection does not go away 
on the first antibiotic, your healthcare provider will prescribe a different one.
 When to seek medical advice
 Call your healthcare provider right away if any of these occur:
  Red areas that spread
  Swelling or pain that gets worse
  Fluid leaking from the skin (pus)
  Fever higher of 100.4 F (38.0 C) or higher after 2 days on antibiotics
 Date Last Reviewed: 2016-2017 [employer]. [address] [place] [address]. All righ
ts reserved. This information is not intended as a substitute for professional medical care.
 Always follow your healthcare professional's instructions.
 
 Electronically signed by Damian Vidal MD at 2018  2:31 PM PST
 documented in this encounter
 
 Progress Notes
 Mandy Lazaro RN - 2018 12:45 PM PSTFormatting of this note might be different
 from the original.
 
 Administrations This Visit  
  cefTRIAXone (ROCEPHIN) injection 1 g  
  Admin Date
 2018 Action
 Given Dose
 1 g Route
 Intramuscular Administered By
 Mandy Lazaro RN 
  
  
  lidocaine 1% injection 2.1 mL  
  Admin Date
 2018 Action
 Given Dose
 2.1 mL Route
 Intramuscular Administered By
 Mandy Lazaro RN 
  
  
  
 
 Pt tolerated well. Mandy Lazaro RN
 Electronically signed by Mandy Lazaro RN at 2018  2:55 PM Winston, Antonietta AGUIRRE MD - 2018 12:45 PM PSTFormatting of this note might be different from the origina
l.
 3/7/2018
 
 Paul Duane Caverly
 1970
 
 Assessment:
 1. Cellulitis of right hand  cefTRIAXone (ROCEPHIN) injection 1 g 
  lidocaine 1% injection 2.1 mL 
  naproxen (NAPROSYN) 500 mg tablet 
  traMADol (ULTRAM) 50 mg tablet 
  cephalexin (KEFLEX) 500 mg capsule 
 
 Three-day history of progressive cellulitis on the back of the right hand associated with m
ultiple small abrasions on the hand, a new tattoo on the right forearm placed 10 days ago.  
No evidence of septic arthritis or systemic symptoms.  He agrees with treatment with IM Roce
phin after discussing the risks and benefits given his penicillin allergy.  We discussed the
 option of starting oral clindamycin or waiting for intravenous dose of alternative antibiot
ic.  He opted for the Rocephin IM.  He was given the following instructions, prescriptions a
nd a note for work.  Follow-up here tomorrow is anticipated.
 
 Plan:
 
 Rocephin shot was provided for infection.
 Take Keflex 500 mg 4 times daily for 10 days for skin infection.
 If you develop an itchy rash, hives, lip swelling or wheezing, stop Keflex and report to [facility] emergency room.
 Take Naprosyn 500 mg, 1 tablet with food twice daily for 5 days, then as needed for pain an
d swelling.
 Take tramadol 50 mg, one tablet up to 3 times daily as needed for breakthrough pain.
 The right hand and arm above the level of your heart as needed for pain and swelling.
 Use warm, moist compresses on the hand and forearm for 15 minutes at a time 3 times daily u
ntil improved.
 Stay home from work avoiding repetitive gripping, grasping and lifting with the right hand 
until symptoms have resolved.
 Return for reevaluation of your hand and forearm tomorrow.
 
 Report to emergency room if he developed progressive pain, redness, swelling, nausea, vomit
ing or other new rapidly progressive associated symptoms.
 
 The risks and benefits, including potential side effects of medication changes, have been d
iscussed with the patient.  We agreed on implementing the current plan.
 The note may have been dictated using Dragon voice recognition software.  It may have not b
een proofread in entirety.  Minor errors in grammar may occur.
 
 History:
 Chief Complaint 
 Patient presents with 
   Hand Swelling 
   room 2/ right hand x 3 days 
 
 Paul Duane Caverly is a 48 y.o. male here for painful redness and swelling on the back of h
is right hand extending onto his forearm starting 3 days ago.  He is a  and frequently
 cuts his fingers and hands in the process of daily work.  He had a tattoo placed on the vol
ar aspect of his right forearm 10 days ago which has been without redness, swelling and drai
nage.  He had difficulty sleeping last night applying warm compress and elevating his arm.  
He went to work today and cannot due to pain with use of the hand and arm.  Denies other inj
ury to the right hand, no history of trauma. History of cellulitis of the left lower leg one
 month ago treated at [facility] with resolution, doesn't recall the antibiotic.  He
 denies fever but feels intermittent chills.  Mild nausea without vomiting.  He has eaten to
day with normal bowel movement.  No history of diabetes mellitus or MRSA infection.
 
 Current medications, past medical, surgical, family and social histories were reviewed and 
updated where appropriate.
 
 Allergies 
 Allergen Reactions 
   Amoxicillin Hives 
 
 Physical Exam:
 /84  | Pulse 103  | Temp 36.3 C (97.4 F) (Temporal)  | Resp 16  | Ht 1.854 m (6' 
1")  | Wt 87.3 kg (192 lb 7.4 oz)  | SpO2 99%  | BMI 25.39 kg/m 
 General: Well appearing, middle-aged male in no distress and appears stated age.
 Skin: Multiple, small, scabbed abrasions on multiple fingers and the back of his right hand
.  No pustules, fluctuance or purulent drainage.  Fingers are callused.  Diffusely erythemat
ous, tender, warm and edematous skin on back of his hand without fluctuance or ecchymosis.  
Mild erythema extends onto the dorsal aspect of the wrist and forearm with several small fol
licular pustules noted on the proximal forearm.  Again, no fluctuance or drainage.  New tatt
oo noted on the proximal, volar forearm without inflammation or drainage.
 Musculoskeletal: Opens and closes the fist completely with minimal pain.  Normal finger spl
ay.  Range of motion of the right wrist and elbow are complete with minimal discomfort.
 Lymphatic: Perhaps mild lymphangitis on the dorsum of the right forearm.  No antecubital or
 axillary adenopathy.
 Neurovascular: Intact radial pulses.  Light touch sensation and capillary refill intact thr
oughout the right hand.
 
 Damian Vidal M.D.
 
 Electronically signed by Damian Vidal MD at 2018  2:55 PM PSTdocumented in 
is encounter
 
 Plan of Treatment
 Not on filedocumented as of this encounter
 
 Visit Diagnoses
 
 
 
+------------------------------------------------------------------------------------------+
| Diagnosis                                                                                |
+------------------------------------------------------------------------------------------+
|   Cellulitis of right hand - Primary  Cellulitis and abscess of hand, except fingers and |
|  thumb                                                                                   |
+------------------------------------------------------------------------------------------+
 documented in this encounter
 
 Administered Medications
 
 
+-----------------------------------+--------+----------+------+------+----------+
| Medication Order                  | MAR    | Action   | Dose | Rate | Site     |
|                                   | Action | Date     |      |      |          |
+-----------------------------------+--------+----------+------+------+----------+
|   cefTRIAXone (ROCEPHIN)          | Given  | 20 | 1 g  |      | Ventrogl |
| injection 1 g  1 g,               |        | 18  2:15 |      |      | uteal-Le |
| Intramuscular, ONCE, Wed 3/7/18   |        |  PM PST  |      |      | ft       |
| at 1430, For 1 dose, MIX WITH 2.1 |        |          |      |      |          |
|  ML LIDOCAINE, Indications:       |        |          |      |      |          |
| PURULENT SKIN AND SOFT TISSUE     |        |          |      |      |          |
| INFECTION                         |        |          |      |      |          |
+-----------------------------------+--------+----------+------+------+----------+
 
 
 
+---+---+
|   |   |
+---+---+
 
 
 
+-----------------------------------+-------+----------+---------+---+----------+
|   lidocaine 1% injection 2.1 mL   | Given | 20 | 2.1 mLs |   | Ventrogl |
| 2.1 mL, Intramuscular, ONCE, Wed  |       | 18  2:18 |         |   | uteal-Le |
| 3/7/18 at 1430, For 1 dose        |       |  PM PST  |         |   | ft       |
+-----------------------------------+-------+----------+---------+---+----------+
 
 
 
+---+---+
|   |   |
+---+---+
 documented in this encounter

## 2020-04-28 NOTE — XMS
Clinical Summary
  Created on: 2020
 
 Caverly, Paul Duane
 External Reference #: [id number]
 : 72
 Sex: Male
 
 Demographics
 
 
+-----------------------+-----------------------------+
| Address               | [address]                  |
|                       | [place]  [address] |
+-----------------------+-----------------------------+
| Home Phone            | [phone]             |
+-----------------------+-----------------------------+
| Preferred Language    | Unknown                     |
+-----------------------+-----------------------------+
| Marital Status        | Single                      |
+-----------------------+-----------------------------+
| Spiritism Affiliation | Unknown                     |
+-----------------------+-----------------------------+
| Race                  | Unknown                     |
+-----------------------+-----------------------------+
| Ethnic Group          | Unknown                     |
+-----------------------+-----------------------------+
 
 
 Author
 
 
+--------------+--------------------------------------------+
| Author       | [facility]  |
|              | and [place]                                |
+--------------+--------------------------------------------+
| Organization | [facility]  |
|              | and [place]                                |
+--------------+--------------------------------------------+
| Address      | Unknown                                    |
+--------------+--------------------------------------------+
| Phone        | Unavailable                                |
+--------------+--------------------------------------------+
 
 
 
 Support
 
 
+---------+--------------+---------+-----------------+
| Name    | Relationship | Address | Phone           |
+---------+--------------+---------+-----------------+
| Name No | ECON         | Unknown | [phone] |
+---------+--------------+---------+-----------------+
 
 
 
 Care Team Providers
 
 
 
+-----------------------+------+-------------+
| Care Team Member Name | Role | Phone       |
+-----------------------+------+-------------+
| No, Physician         | PCP  | Unavailable |
+-----------------------+------+-------------+
 
 
 
 Allergies
 
 
+----------------+-----------+----------+----------+----------+
| Active Allergy | Reactions | Severity | Noted    | Comments |
|                |           |          | Date     |          |
+----------------+-----------+----------+----------+----------+
| Amoxicillin    | Hives     |          | 20 |          |
|                |           |          | 18       |          |
+----------------+-----------+----------+----------+----------+
 
 
 
 Medications
 
 
+----------------------+----------------------+-----------+---------+------+------+-------+
| Medication           | Sig                  | Dispensed | Refills | Star | End  | Statu |
|                      |                      |           |         | t    | Date | s     |
|                      |                      |           |         | Date |      |       |
+----------------------+----------------------+-----------+---------+------+------+-------+
|   amitriptyline      | Take 200 mg by mouth |           | 0       |      |      | Activ |
| (ELAVIL) 100 MG      |  nightly.            |           |         |      |      | e     |
| tablet               |                      |           |         |      |      |       |
+----------------------+----------------------+-----------+---------+------+------+-------+
|   naproxen           | Take 1 one tablet    |   30      | 0       | 03/0 |      | Activ |
| (NAPROSYN) 500 mg    | with food twice      | tablet    |         | 7/20 |      | e     |
| tabletIndications:   | daily for 5 days,    |           |         | 18   |      |       |
| Cellulitis of right  | then as needed for   |           |         |      |      |       |
| hand                 | pain.                |           |         |      |      |       |
+----------------------+----------------------+-----------+---------+------+------+-------+
|   traMADol (ULTRAM)  | Take 1 tablet by     |   12      | 0       | 03/0 |      | Activ |
| 50 mg                | mouth every 8 hours  | tablet    |         | 7/20 |      | e     |
| tabletIndications:   | as needed.           |           |         | 18   |      |       |
| Cellulitis of right  |                      |           |         |      |      |       |
| hand                 |                      |           |         |      |      |       |
+----------------------+----------------------+-----------+---------+------+------+-------+
 
 
 
 Active Problems
 No known active problems
 
 Social History
 
 
+--------------------+-------+-----------+--------+------+
| Tobacco Use        | Types | Packs/Day | Years  | Date |
|                    |       |           | Used   |      |
+--------------------+-------+-----------+--------+------+
 
| Current Every Day  |       |           |        |      |
| Smoker             |       |           |        |      |
+--------------------+-------+-----------+--------+------+
 
 
 
+---------------------+---+---+---+
| Smokeless Tobacco:  |   |   |   |
| Never Used          |   |   |   |
+---------------------+---+---+---+
 
 
 
+------------------+---------------+
| Sex Assigned at  | Date Recorded |
| Birth            |               |
+------------------+---------------+
| Not on file      |               |
+------------------+---------------+
 
 
 
+----------------+-------------+-------------+
| Job Start Date | Occupation  | Industry    |
+----------------+-------------+-------------+
| Not on file    | Not on file | Not on file |
+----------------+-------------+-------------+
 
 
 
+----------------+--------------+------------+
| Travel History | Travel Start | Travel End |
+----------------+--------------+------------+
 
 
 
+-------------------------------------+
| No recent travel history available. |
+-------------------------------------+
 
 
 
 Last Filed Vital Signs
 
 
+-------------------+----------------------+----------------------+----------+
| Vital Sign        | Reading              | Time Taken           | Comments |
+-------------------+----------------------+----------------------+----------+
| Blood Pressure    | 124/84               | 2018  1:11 PM  |          |
|                   |                      | PST                  |          |
+-------------------+----------------------+----------------------+----------+
| Pulse             | 103                  | 2018  1:11 PM  |          |
|                   |                      | PST                  |          |
+-------------------+----------------------+----------------------+----------+
| Temperature       | 36.3   C (97.4   F)  | 2018  1:11 PM  |          |
|                   |                      | PST                  |          |
+-------------------+----------------------+----------------------+----------+
| Respiratory Rate  | 16                   | 2018  1:11 PM  |          |
|                   |                      | PST                  |          |
+-------------------+----------------------+----------------------+----------+
 
| Oxygen Saturation | 99%                  | 2018  1:11 PM  |          |
|                   |                      | PST                  |          |
+-------------------+----------------------+----------------------+----------+
| Inhaled Oxygen    | -                    | -                    |          |
| Concentration     |                      |                      |          |
+-------------------+----------------------+----------------------+----------+
| Weight            | 87.3 kg (192 lb 7.4  | 2018  1:11 PM  |          |
|                   | oz)                  | PST                  |          |
+-------------------+----------------------+----------------------+----------+
| Height            | 185.4 cm (6' 1")     | 2018  1:11 PM  |          |
|                   |                      | PST                  |          |
+-------------------+----------------------+----------------------+----------+
| Body Mass Index   | 25.39                | 2018  1:11 PM  |          |
|                   |                      | PST                  |          |
+-------------------+----------------------+----------------------+----------+
 
 
 
 Plan of Treatment
 
 
+---------------------+-----------+-----------+----------+
| Health Maintenance  | Due Date  | Last Done | Comments |
+---------------------+-----------+-----------+----------+
| Vaccine:            |  |           |          |
| Dtap/Tdap/Td (1 -   | 2         |           |          |
| Tdap)               |           |           |          |
+---------------------+-----------+-----------+----------+
| Vaccine: Influenza  |  |           |          |
| (Season Ended)      | 0         |           |          |
+---------------------+-----------+-----------+----------+
 
 
 
 Results
 Not on filefrom Last 3 Months
 
 Insurance
 
 
+-------------------+--------+-------------+--------+-------------+---------+--------+
| Payer             | Benefi | Subscriber  | Effect | Phone       | Address | Type   |
|                   | t Plan | ID          | rachel    |             |         |        |
|                   |  /     |             | Dates  |             |         |        |
|                   | Group  |             |        |             |         |        |
+-------------------+--------+-------------+--------+-------------+---------+--------+
| [employer]  | [employer]   | [id number]    | 3/7/20 | [phone] |         | [employer] |
| MEDICAID HMO      | HEALTH |             | 18-Pre | 1           |         | id     |
|                   |  MDCD  |             | sent   |             |         |        |
|                   | HMO [place] |             |        |             |         |        |
+-------------------+--------+-------------+--------+-------------+---------+--------+
 
 
 
+--------------------+--------+-------------+--------+-------------+----------------------+
| Guarantor Name     | Accoun | Relation to | Date   | Phone       | Billing Address      |
|                    | t Type |  Patient    | of     |             |                      |
|                    |        |             | Birth  |             |                      |
+--------------------+--------+-------------+--------+-------------+----------------------+
| Caverly,Paul Duane | Person | Self        | / |             |   [address] |
 
|                    | al/Fam |             | 1970   | [phone] |  [place] [address] |
|                    | [place]    |             |        | 5 (Home)    |                      |
+--------------------+--------+-------------+--------+-------------+----------------------+
 
 
 
 Advance Directives
 
 
+-----------+---------------+----------------+-------------+
| Type      | Date Recorded | Patient        | Explanation |
|           |               | Representative |             |
+-----------+---------------+----------------+-------------+
| Power of  |               |                |             |
|   |               |                |             |
+-----------+---------------+----------------+-------------+
| Advance   |               |                |             |
| Directive |               |                |             |
+-----------+---------------+----------------+-------------+

## 2020-04-28 NOTE — XMS
Encounter Summary
  Created on: 2020
 
 Caverly, Paul Duane
 External Reference #: [id number]
 : 72
 Sex: Male
 
 Demographics
 
 
+-----------------------+-----------------------------+
| Address               | [address]                  |
|                       | [place]  [address] |
+-----------------------+-----------------------------+
| Home Phone            | [phone]             |
+-----------------------+-----------------------------+
| Preferred Language    | Unknown                     |
+-----------------------+-----------------------------+
| Marital Status        | Single                      |
+-----------------------+-----------------------------+
| Oriental orthodox Affiliation | Unknown                     |
+-----------------------+-----------------------------+
| Race                  | Unknown                     |
+-----------------------+-----------------------------+
| Ethnic Group          | Unknown                     |
+-----------------------+-----------------------------+
 
 
 Author
 
 
+--------------+--------------------------------------------+
| Author       | [facility]  |
|              | and [place]                                |
+--------------+--------------------------------------------+
| Organization | [facility]  |
|              | and [place]                                |
+--------------+--------------------------------------------+
| Address      | Unknown                                    |
+--------------+--------------------------------------------+
| Phone        | Unavailable                                |
+--------------+--------------------------------------------+
 
 
 
 Support
 
 
+---------+--------------+---------+-----------------+
| Name    | Relationship | Address | Phone           |
+---------+--------------+---------+-----------------+
| Name No | ECON         | Unknown | [phone] |
+---------+--------------+---------+-----------------+
 
 
 
 Care Team Providers
 
 
 
+-----------------------+------+-------------+
| Care Team Member Name | Role | Phone       |
+-----------------------+------+-------------+
 PCP  | Unavailable |
+-----------------------+------+-------------+
 
 
 
 Encounter Details
 
 
+--------+-----------+----------------------+----------------------+-------------+
| Date   | Type      | Department           | Care Team            | Description |
+--------+-----------+----------------------+----------------------+-------------+
| / | Hospital  |   [facility] |   Jada,       |             |
|    | Encounter |  MED [facility] EMERGENCY   | Jerzy ORR MD  [address]  |             |
|        |           | CENTER  [address] | [place]     |             |
|        |           |   [place]    | [place] [id number] |             |
|        |           | [address]           |   [phone]       |             |
|        |           | [phone]         | [phone] (Fax)   |             |
+--------+-----------+----------------------+----------------------+-------------+
 
 
 
 Social History
 
 
+----------------+-------+-----------+--------+------+
| Tobacco Use    | Types | Packs/Day | Years  | Date |
|                |       |           | Used   |      |
+----------------+-------+-----------+--------+------+
| Never Assessed |       |           |        |      |
+----------------+-------+-----------+--------+------+
 
 
 
+------------------+---------------+
| Sex Assigned at  | Date Recorded |
| Birth            |               |
+------------------+---------------+
| Not on file      |               |
+------------------+---------------+
 
 
 
+----------------+-------------+-------------+
| Job Start Date | Occupation  | Industry    |
+----------------+-------------+-------------+
| Not on file    | Not on file | Not on file |
+----------------+-------------+-------------+
 
 
 
+----------------+--------------+------------+
| Travel History | Travel Start | Travel End |
+----------------+--------------+------------+
 
 
 
 
+-------------------------------------+
| No recent travel history available. |
+-------------------------------------+
 documented as of this encounter
 
 Plan of Treatment
 Not on filedocumented as of this encounter
 
 Visit Diagnoses
 Not on filedocumented in this encounter

## 2020-04-28 NOTE — XMS
Encounter Summary
  Created on: 2020
 
 Caverly, Paul Duane
 External Reference #: [id number]
 : 72
 Sex: Male
 
 Demographics
 
 
+-----------------------+-----------------------------+
| Address               | [address]                  |
|                       | [place]  [address] |
+-----------------------+-----------------------------+
| Home Phone            | [phone]             |
+-----------------------+-----------------------------+
| Preferred Language    | Unknown                     |
+-----------------------+-----------------------------+
| Marital Status        | Single                      |
+-----------------------+-----------------------------+
| Orthodox Affiliation | Unknown                     |
+-----------------------+-----------------------------+
| Race                  | Unknown                     |
+-----------------------+-----------------------------+
| Ethnic Group          | Unknown                     |
+-----------------------+-----------------------------+
 
 
 Author
 
 
+--------------+--------------------------------------------+
| Author       | [facility]  |
|              | and [place]                                |
+--------------+--------------------------------------------+
| Organization | [facility]  |
|              | and [place]                                |
+--------------+--------------------------------------------+
| Address      | Unknown                                    |
+--------------+--------------------------------------------+
| Phone        | Unavailable                                |
+--------------+--------------------------------------------+
 
 
 
 Support
 
 
+---------+--------------+---------+-----------------+
| Name    | Relationship | Address | Phone           |
+---------+--------------+---------+-----------------+
| Name No | ECON         | Unknown | [phone] |
+---------+--------------+---------+-----------------+
 
 
 
 Care Team Providers
 
 
 
+-----------------------+------+-------------+
| Care Team Member Name | Role | Phone       |
+-----------------------+------+-------------+
 PCP  | Unavailable |
+-----------------------+------+-------------+
 
 
 
 Encounter Details
 
 
+--------+-----------+----------------------+--------------------+-------------+
| Date   | Type      | Department           | Care Team          | Description |
+--------+-----------+----------------------+--------------------+-------------+
| / | [facility]  |   [facility] |   Parvez Vizcaino   |             |
|    | Encounter |  MED CTR XRAY  [address]    |             |
|        |           |  [address]       | [address]    |             |
|        |           | [place] [address] | [place] [address]    |             |
|        |           |   [phone]       | [phone]       |             |
|        |           |                      | [phone] (Fax) |             |
+--------+-----------+----------------------+--------------------+-------------+
 
 
 
 Social History
 
 
+----------------+-------+-----------+--------+------+
| Tobacco Use    | Types | Packs/Day | Years  | Date |
|                |       |           | Used   |      |
+----------------+-------+-----------+--------+------+
| Never Assessed |       |           |        |      |
+----------------+-------+-----------+--------+------+
 
 
 
+------------------+---------------+
| Sex Assigned at  | Date Recorded |
| Birth            |               |
+------------------+---------------+
| Not on file      |               |
+------------------+---------------+
 
 
 
+----------------+-------------+-------------+
| Job Start Date | Occupation  | Industry    |
+----------------+-------------+-------------+
| Not on file    | Not on file | Not on file |
+----------------+-------------+-------------+
 
 
 
+----------------+--------------+------------+
| Travel History | Travel Start | Travel End |
+----------------+--------------+------------+
 
 
 
 
+-------------------------------------+
| No recent travel history available. |
+-------------------------------------+
 documented as of this encounter
 
 Plan of Treatment
 Not on filedocumented as of this encounter
 
 Visit Diagnoses
 Not on filedocumented in this encounter

## 2020-04-28 NOTE — XMS
Encounter Summary
  Created on: 2020
 
 Caverly, Paul Duane
 External Reference #: [id number]
 : 72
 Sex: Male
 
 Demographics
 
 
+-----------------------+-----------------------------+
| Address               | [address]                  |
|                       | [place]  [address] |
+-----------------------+-----------------------------+
| Home Phone            | [phone]             |
+-----------------------+-----------------------------+
| Preferred Language    | Unknown                     |
+-----------------------+-----------------------------+
| Marital Status        | Single                      |
+-----------------------+-----------------------------+
| Mu-ism Affiliation | Unknown                     |
+-----------------------+-----------------------------+
| Race                  | Unknown                     |
+-----------------------+-----------------------------+
| Ethnic Group          | Unknown                     |
+-----------------------+-----------------------------+
 
 
 Author
 
 
+--------------+--------------------------------------------+
| Author       | [facility]  |
|              | and [place]                                |
+--------------+--------------------------------------------+
| Organization | [facility]  |
|              | and [place]                                |
+--------------+--------------------------------------------+
| Address      | Unknown                                    |
+--------------+--------------------------------------------+
| Phone        | Unavailable                                |
+--------------+--------------------------------------------+
 
 
 
 Support
 
 
+---------+--------------+---------+-----------------+
| Name    | Relationship | Address | Phone           |
+---------+--------------+---------+-----------------+
| Name No | ECON         | Unknown | [phone] |
+---------+--------------+---------+-----------------+
 
 
 
 Care Team Providers
 
 
 
+-----------------------+------+-------------+
| Care Team Member Name | Role | Phone       |
+-----------------------+------+-------------+
 PCP  | Unavailable |
+-----------------------+------+-------------+
 
 
 
 Encounter Details
 
 
+--------+-----------+----------------------+--------------------+-------------+
| Date   | Type      | Department           | Care Team          | Description |
+--------+-----------+----------------------+--------------------+-------------+
| / | [facility]  |   [facility] |   Hayden Farris   |             |
|    | Encounter |  [facility]   | MD [address]  [address]   |             |
|        |           | CENTER  [address] | [place]   |             |
|        |           |   [place]    | [place] [address]    |             |
|        |           | [address]           | [phone]       |             |
|        |           | [phone]         | [phone] (Fax) |             |
+--------+-----------+----------------------+--------------------+-------------+
 
 
 
 Social History
 
 
+----------------+-------+-----------+--------+------+
| Tobacco Use    | Types | Packs/Day | Years  | Date |
|                |       |           | Used   |      |
+----------------+-------+-----------+--------+------+
| Never Assessed |       |           |        |      |
+----------------+-------+-----------+--------+------+
 
 
 
+------------------+---------------+
| Sex Assigned at  | Date Recorded |
| Birth            |               |
+------------------+---------------+
| Not on file      |               |
+------------------+---------------+
 
 
 
+----------------+-------------+-------------+
| Job Start Date | Occupation  | Industry    |
+----------------+-------------+-------------+
| Not on file    | Not on file | Not on file |
+----------------+-------------+-------------+
 
 
 
+----------------+--------------+------------+
| Travel History | Travel Start | Travel End |
+----------------+--------------+------------+
 
 
 
 
+-------------------------------------+
| No recent travel history available. |
+-------------------------------------+
 documented as of this encounter
 
 Plan of Treatment
 Not on filedocumented as of this encounter
 
 Visit Diagnoses
 Not on filedocumented in this encounter

## 2020-04-28 NOTE — XMS
Clinical Summary
  Created on: 2020
 
 Caverly, Paul Duane
 External Reference #: [id number]
 : 72
 Sex: Male
 
 Demographics
 
 
+-----------------------+-----------------------------+
| Address               | [address]                  |
|                       | [place]  [address] |
+-----------------------+-----------------------------+
| Home Phone            | [phone]             |
+-----------------------+-----------------------------+
| Preferred Language    | Unknown                     |
+-----------------------+-----------------------------+
| Marital Status        | Single                      |
+-----------------------+-----------------------------+
| Episcopalian Affiliation | Unknown                     |
+-----------------------+-----------------------------+
| Race                  | Unknown                     |
+-----------------------+-----------------------------+
| Ethnic Group          | Unknown                     |
+-----------------------+-----------------------------+
 
 
 Author
 
 
+--------------+--------------------------------------------+
| Author       | [facility] [place]  |
|              | and [place]                                |
+--------------+--------------------------------------------+
| Organization | [facility]  |
|              | and [place]                                |
+--------------+--------------------------------------------+
| Address      | Unknown                                    |
+--------------+--------------------------------------------+
| Phone        | Unavailable                                |
+--------------+--------------------------------------------+
 
 
 
 Support
 
 
+---------+--------------+---------+-----------------+
| Name    | Relationship | Address | Phone           |
+---------+--------------+---------+-----------------+
| Name No | ECON         | Unknown | [phone] |
+---------+--------------+---------+-----------------+
 
 
 
 Care Team Providers
 
 
 
+-----------------------+------+-------------+
| Care Team Member Name | Role | Phone       |
+-----------------------+------+-------------+
| No, Physician         | PCP  | Unavailable |
+-----------------------+------+-------------+
 
 
 
 Allergies
 
 
+----------------+-----------+----------+----------+----------+
| Active Allergy | Reactions | Severity | Noted    | Comments |
|                |           |          | Date     |          |
+----------------+-----------+----------+----------+----------+
| Amoxicillin    | Hives     |          | 20 |          |
|                |           |          | 18       |          |
+----------------+-----------+----------+----------+----------+
 
 
 
 Medications
 
 
+----------------------+----------------------+-----------+---------+------+------+-------+
| Medication           | Sig                  | Dispensed | Refills | Star | End  | Statu |
|                      |                      |           |         | t    | Date | s     |
|                      |                      |           |         | Date |      |       |
+----------------------+----------------------+-----------+---------+------+------+-------+
|   amitriptyline      | Take 200 mg by mouth |           | 0       |      |      | Activ |
| (ELAVIL) 100 MG      |  nightly.            |           |         |      |      | e     |
| tablet               |                      |           |         |      |      |       |
+----------------------+----------------------+-----------+---------+------+------+-------+
|   naproxen           | Take 1 one tablet    |   30      | 0       | 03/0 |      | Activ |
| (NAPROSYN) 500 mg    | with food twice      | tablet    |         | 7/20 |      | e     |
| tabletIndications:   | daily for 5 days,    |           |         | 18   |      |       |
| Cellulitis of right  | then as needed for   |           |         |      |      |       |
| hand                 | pain.                |           |         |      |      |       |
+----------------------+----------------------+-----------+---------+------+------+-------+
|   traMADol (ULTRAM)  | Take 1 tablet by     |   12      | 0       | 03/0 |      | Activ |
| 50 mg                | mouth every 8 hours  | tablet    |         | 7/20 |      | e     |
| tabletIndications:   | as needed.           |           |         | 18   |      |       |
| Cellulitis of right  |                      |           |         |      |      |       |
| hand                 |                      |           |         |      |      |       |
+----------------------+----------------------+-----------+---------+------+------+-------+
 
 
 
 Active Problems
 No known active problems
 
 Social History
 
 
+--------------------+-------+-----------+--------+------+
| Tobacco Use        | Types | Packs/Day | Years  | Date |
|                    |       |           | Used   |      |
+--------------------+-------+-----------+--------+------+
 
| Current Every Day  |       |           |        |      |
| Smoker             |       |           |        |      |
+--------------------+-------+-----------+--------+------+
 
 
 
+---------------------+---+---+---+
| Smokeless Tobacco:  |   |   |   |
| Never Used          |   |   |   |
+---------------------+---+---+---+
 
 
 
+------------------+---------------+
| Sex Assigned at  | Date Recorded |
| Birth            |               |
+------------------+---------------+
| Not on file      |               |
+------------------+---------------+
 
 
 
+----------------+-------------+-------------+
| Job Start Date | Occupation  | Industry    |
+----------------+-------------+-------------+
| Not on file    | Not on file | Not on file |
+----------------+-------------+-------------+
 
 
 
+----------------+--------------+------------+
| Travel History | Travel Start | Travel End |
+----------------+--------------+------------+
 
 
 
+-------------------------------------+
| No recent travel history available. |
+-------------------------------------+
 
 
 
 Last Filed Vital Signs
 
 
+-------------------+----------------------+----------------------+----------+
| Vital Sign        | Reading              | Time Taken           | Comments |
+-------------------+----------------------+----------------------+----------+
| Blood Pressure    | 124/84               | 2018  1:11 PM  |          |
|                   |                      | PST                  |          |
+-------------------+----------------------+----------------------+----------+
| Pulse             | 103                  | 2018  1:11 PM  |          |
|                   |                      | PST                  |          |
+-------------------+----------------------+----------------------+----------+
| Temperature       | 36.3   C (97.4   F)  | 2018  1:11 PM  |          |
|                   |                      | PST                  |          |
+-------------------+----------------------+----------------------+----------+
| Respiratory Rate  | 16                   | 2018  1:11 PM  |          |
|                   |                      | PST                  |          |
+-------------------+----------------------+----------------------+----------+
 
| Oxygen Saturation | 99%                  | 2018  1:11 PM  |          |
|                   |                      | PST                  |          |
+-------------------+----------------------+----------------------+----------+
| Inhaled Oxygen    | -                    | -                    |          |
| Concentration     |                      |                      |          |
+-------------------+----------------------+----------------------+----------+
| Weight            | 87.3 kg (192 lb 7.4  | 2018  1:11 PM  |          |
|                   | oz)                  | PST                  |          |
+-------------------+----------------------+----------------------+----------+
| Height            | 185.4 cm (6' 1")     | 2018  1:11 PM  |          |
|                   |                      | PST                  |          |
+-------------------+----------------------+----------------------+----------+
| Body Mass Index   | 25.39                | 2018  1:11 PM  |          |
|                   |                      | PST                  |          |
+-------------------+----------------------+----------------------+----------+
 
 
 
 Plan of Treatment
 
 
+---------------------+-----------+-----------+----------+
| Health Maintenance  | Due Date  | Last Done | Comments |
+---------------------+-----------+-----------+----------+
| Vaccine:            |  |           |          |
| Dtap/Tdap/Td (1 -   | 2         |           |          |
| Tdap)               |           |           |          |
+---------------------+-----------+-----------+----------+
| Vaccine: Influenza  |  |           |          |
| (Season Ended)      | 0         |           |          |
+---------------------+-----------+-----------+----------+
 
 
 
 Results
 Not on filefrom Last 3 Months
 
 Insurance
 
 
+-------------------+--------+-------------+--------+-------------+---------+--------+
| Payer             | Benefi | Subscriber  | Effect | Phone       | Address | Type   |
|                   | t Plan | ID          | rachel    |             |         |        |
|                   |  /     |             | Dates  |             |         |        |
|                   | Group  |             |        |             |         |        |
+-------------------+--------+-------------+--------+-------------+---------+--------+
| [employer]  | [employer]   | [id number]    | 3/7/20 | [phone] |         | [employer] |
| MEDICAID HMO      | HEALTH |             | 18-Pre | 1           |         | id     |
|                   |  MDCD  |             | sent   |             |         |        |
|                   | HMO [place] |             |        |             |         |        |
+-------------------+--------+-------------+--------+-------------+---------+--------+
 
 
 
+--------------------+--------+-------------+--------+-------------+----------------------+
| Guarantor Name     | Accoun | Relation to | Date   | Phone       | Billing Address      |
|                    | t Type |  Patient    | of     |             |                      |
|                    |        |             | Birth  |             |                      |
+--------------------+--------+-------------+--------+-------------+----------------------+
| Caverly,Paul Duane | Person | Self        | / |             |   [address] |
 
|                    | al/Fam |             | 1970   | [phone] |  [place] [address] |
|                    | [place]    |             |        | [address] (Home)    |                      |
+--------------------+--------+-------------+--------+-------------+----------------------+
 
 
 
 Advance Directives
 
 
+-----------+---------------+----------------+-------------+
| Type      | Date Recorded | Patient        | Explanation |
|           |               | Representative |             |
+-----------+---------------+----------------+-------------+
| Power of  |               |                |             |
|   |               |                |             |
+-----------+---------------+----------------+-------------+
| Advance   |               |                |             |
| Directive |               |                |             |
+-----------+---------------+----------------+-------------+

## 2020-06-06 NOTE — XMS
Encounter Summary
  Created on: 2020
 
 Caverly, Paul Duane
 External Reference #: [id number]
 : 72
 Sex: Male
 
 Demographics
 
 
+-----------------------+-----------------------------+
| Address               | [address]                  |
|                       | [place]  [address] |
+-----------------------+-----------------------------+
| Home Phone            | [phone]             |
+-----------------------+-----------------------------+
| Preferred Language    | Unknown                     |
+-----------------------+-----------------------------+
| Marital Status        | Single                      |
+-----------------------+-----------------------------+
| Gnosticism Affiliation | Unknown                     |
+-----------------------+-----------------------------+
| Race                  | Unknown                     |
+-----------------------+-----------------------------+
| Ethnic Group          | Unknown                     |
+-----------------------+-----------------------------+
 
 
 Author
 
 
+--------------+--------------------------------------------+
| Author       | [facility]  |
|              | and [place]                                |
+--------------+--------------------------------------------+
| Organization | [facility]  |
|              | and [place]                                |
+--------------+--------------------------------------------+
| Address      | Unknown                                    |
+--------------+--------------------------------------------+
| Phone        | Unavailable                                |
+--------------+--------------------------------------------+
 
 
 
 Support
 
 
+---------+--------------+---------+-----------------+
| Name    | Relationship | Address | Phone           |
+---------+--------------+---------+-----------------+
| Name No | ECON         | Unknown | [phone] |
+---------+--------------+---------+-----------------+
 
 
 
 Care Team Providers
 
 
 
+-----------------------+------+-------------+
| Care Team Member Name | Role | Phone       |
+-----------------------+------+-------------+
 PCP  | Unavailable |
+-----------------------+------+-------------+
 
 
 
 Encounter Details
 
 
+--------+-----------+----------------------+-----------+-------------+
| Date   | Type      | Department           | Care Team | Description |
+--------+-----------+----------------------+-----------+-------------+
| / | Hospital  |   [facility] |           |             |
|    | Encounter |  MED CTR EMERGENCY   |           |             |
|        |           | CENTER  [address] |           |             |
|        |           |   [place]    |           |             |
|        |           | [address]           |           |             |
|        |           | [phone]         |           |             |
+--------+-----------+----------------------+-----------+-------------+
 
 
 
 Social History
 
 
+----------------+-------+-----------+--------+------+
| Tobacco Use    | Types | Packs/Day | Years  | Date |
|                |       |           | Used   |      |
+----------------+-------+-----------+--------+------+
| Never Assessed |       |           |        |      |
+----------------+-------+-----------+--------+------+
 
 
 
+------------------+---------------+
| Sex Assigned at  | Date Recorded |
| Birth            |               |
+------------------+---------------+
| Not on file      |               |
+------------------+---------------+
 
 
 
+----------------+-------------+-------------+
| Job Start Date | Occupation  | Industry    |
+----------------+-------------+-------------+
| Not on file    | Not on file | Not on file |
+----------------+-------------+-------------+
 
 
 
+----------------+--------------+------------+
| Travel History | Travel Start | Travel End |
+----------------+--------------+------------+
 
 
 
 
+-------------------------------------+
| No recent travel history available. |
+-------------------------------------+
 documented as of this encounter
 
 Plan of Treatment
 Not on filedocumented as of this encounter
 
 Visit Diagnoses
 Not on filedocumented in this encounter

## 2020-06-06 NOTE — XMS
Encounter Summary
  Created on: 2020
 
 Caverly, Paul Duane
 External Reference #: [id number]
 : 72
 Sex: Male
 
 Demographics
 
 
+-----------------------+-----------------------------+
| Address               | [address]                  |
|                       | [place]  [address] |
+-----------------------+-----------------------------+
| Home Phone            | [phone]             |
+-----------------------+-----------------------------+
| Preferred Language    | Unknown                     |
+-----------------------+-----------------------------+
| Marital Status        | Single                      |
+-----------------------+-----------------------------+
| Jew Affiliation | Unknown                     |
+-----------------------+-----------------------------+
| Race                  | Unknown                     |
+-----------------------+-----------------------------+
| Ethnic Group          | Unknown                     |
+-----------------------+-----------------------------+
 
 
 Author
 
 
+--------------+--------------------------------------------+
| Author       | [facility]  |
|              | and [place]                                |
+--------------+--------------------------------------------+
| Organization | [facility]  |
|              | and [place]                                |
+--------------+--------------------------------------------+
| Address      | Unknown                                    |
+--------------+--------------------------------------------+
| Phone        | Unavailable                                |
+--------------+--------------------------------------------+
 
 
 
 Support
 
 
+---------+--------------+---------+-----------------+
| Name    | Relationship | Address | Phone           |
+---------+--------------+---------+-----------------+
| Name No | ECON         | Unknown | [phone] |
+---------+--------------+---------+-----------------+
 
 
 
 Care Team Providers
 
 
 
+-----------------------+------+-------------+
| Care Team Member Name | Role | Phone       |
+-----------------------+------+-------------+
 PCP  | Unavailable |
+-----------------------+------+-------------+
 
 
 
 Encounter Details
 
 
+--------+-----------+----------------------+-----------+-------------+
| Date   | Type      | Department           | Care Team | Description |
+--------+-----------+----------------------+-----------+-------------+
| / | Hospital  |   [facility] |           |             |
|    | Encounter |  [facility] EMER[facility]ENCY   |           |             |
|        |           | CENTER  [address] |           |             |
|        |           |   [place]    |           |             |
|        |           | [address]           |           |             |
|        |           | [phone]         |           |             |
+--------+-----------+----------------------+-----------+-------------+
 
 
 
 Social History
 
 
+----------------+-------+-----------+--------+------+
| Tobacco Use    | Types | Packs/Day | Years  | Date |
|                |       |           | Used   |      |
+----------------+-------+-----------+--------+------+
| Never Assessed |       |           |        |      |
+----------------+-------+-----------+--------+------+
 
 
 
+------------------+---------------+
| Sex Assigned at  | Date Recorded |
| Birth            |               |
+------------------+---------------+
| Not on file      |               |
+------------------+---------------+
 
 
 
+----------------+-------------+-------------+
| Job Start Date | Occupation  | Industry    |
+----------------+-------------+-------------+
| Not on file    | Not on file | Not on file |
+----------------+-------------+-------------+
 
 
 
+----------------+--------------+------------+
| Travel History | Travel Start | Travel End |
+----------------+--------------+------------+
 
 
 
 
+-------------------------------------+
| No recent travel history available. |
+-------------------------------------+
 documented as of this encounter
 
 Plan of Treatment
 Not on filedocumented as of this encounter
 
 Visit Diagnoses
 Not on filedocumented in this encounter

## 2020-06-06 NOTE — XMS
Encounter Summary
  Created on: 2020
 
 Caverly, Paul Duane
 External Reference #: [id number]
 : 72
 Sex: Male
 
 Demographics
 
 
+-----------------------+-----------------------------+
| Address               | [address]                  |
|                       | [place]  [address] |
+-----------------------+-----------------------------+
| Home Phone            | [phone]             |
+-----------------------+-----------------------------+
| Preferred Language    | Unknown                     |
+-----------------------+-----------------------------+
| Marital Status        | Single                      |
+-----------------------+-----------------------------+
| Mosque Affiliation | Unknown                     |
+-----------------------+-----------------------------+
| Race                  | Unknown                     |
+-----------------------+-----------------------------+
| Ethnic Group          | Unknown                     |
+-----------------------+-----------------------------+
 
 
 Author
 
 
+--------------+--------------------------------------------+
| Author       | [facility]  |
|              | and [place]                                |
+--------------+--------------------------------------------+
| Organization | [facility]  |
|              | and [place]                                |
+--------------+--------------------------------------------+
| Address      | Unknown                                    |
+--------------+--------------------------------------------+
| Phone        | Unavailable                                |
+--------------+--------------------------------------------+
 
 
 
 Support
 
 
+---------+--------------+---------+-----------------+
| Name    | Relationship | Address | Phone           |
+---------+--------------+---------+-----------------+
| Name No | ECON         | Unknown | [phone] |
+---------+--------------+---------+-----------------+
 
 
 
 Care Team Providers
 
 
 
+-----------------------+------+-------------+
| Care Team Member Name | Role | Phone       |
+-----------------------+------+-------------+
 PCP  | Unavailable |
+-----------------------+------+-------------+
 
 
 
 Encounter Details
 
 
+--------+-----------+----------------------+----------------------+-------------+
| Date   | Type      | Department           | Care Team            | Description |
+--------+-----------+----------------------+----------------------+-------------+
| / | Hospital  |   [facility] |   Estrellita Hightower  |             |
|    | Encounter |  MED CTR EMERGENCY   | MD [facility]  |             |
|        |           | CENTER  [address] | [address]  [place],  |             |
|        |           |   [place]    | [place] [address]             |             |
|        |           | [address]           | [phone]         |             |
|        |           | [phone]         | [phone] (Fax)   |             |
+--------+-----------+----------------------+----------------------+-------------+
 
 
 
 Social History
 
 
+----------------+-------+-----------+--------+------+
| Tobacco Use    | Types | Packs/Day | Years  | Date |
|                |       |           | Used   |      |
+----------------+-------+-----------+--------+------+
| Never Assessed |       |           |        |      |
+----------------+-------+-----------+--------+------+
 
 
 
+------------------+---------------+
| Sex Assigned at  | Date Recorded |
| Birth            |               |
+------------------+---------------+
| Not on file      |               |
+------------------+---------------+
 
 
 
+----------------+-------------+-------------+
| Job Start Date | Occupation  | Industry    |
+----------------+-------------+-------------+
| Not on file    | Not on file | Not on file |
+----------------+-------------+-------------+
 
 
 
+----------------+--------------+------------+
| Travel History | Travel Start | Travel End |
+----------------+--------------+------------+
 
 
 
 
+-------------------------------------+
| No recent travel history available. |
+-------------------------------------+
 documented as of this encounter
 
 Plan of Treatment
 Not on filedocumented as of this encounter
 
 Visit Diagnoses
 Not on filedocumented in this encounter

## 2020-06-06 NOTE — XMS
Encounter Summary
  Created on: 2020
 
 Caverly, Paul Duane
 External Reference #: [id number]
 : 72
 Sex: Male
 
 Demographics
 
 
+-----------------------+-----------------------------+
| Address               | [address]                  |
|                       | [place]  [address] |
+-----------------------+-----------------------------+
| Home Phone            | [phone]             |
+-----------------------+-----------------------------+
| Preferred Language    | Unknown                     |
+-----------------------+-----------------------------+
| Marital Status        | Single                      |
+-----------------------+-----------------------------+
| Protestant Affiliation | Unknown                     |
+-----------------------+-----------------------------+
| Race                  | Unknown                     |
+-----------------------+-----------------------------+
| Ethnic Group          | Unknown                     |
+-----------------------+-----------------------------+
 
 
 Author
 
 
+--------------+--------------------------------------------+
| Author       | [facility]  |
|              | and [place]                                |
+--------------+--------------------------------------------+
| Organization | [facility]  |
|              | and [place]                                |
+--------------+--------------------------------------------+
| Address      | Unknown                                    |
+--------------+--------------------------------------------+
| Phone        | Unavailable                                |
+--------------+--------------------------------------------+
 
 
 
 Support
 
 
+---------+--------------+---------+-----------------+
| Name    | Relationship | Address | Phone           |
+---------+--------------+---------+-----------------+
| Name No | ECON         | Unknown | [phone] |
+---------+--------------+---------+-----------------+
 
 
 
 Care Team Providers
 
 
 
+-----------------------+------+-------------+
| Care Team Member Name | Role | Phone       |
+-----------------------+------+-------------+
 PCP  | Unavailable |
+-----------------------+------+-------------+
 
 
 
 Encounter Details
 
 
+--------+-----------+----------------------+----------------------+-------------+
| Date   | Type      | Department           | Care Team            | Description |
+--------+-----------+----------------------+----------------------+-------------+
| / | Hospital  |   [facility] |   Jada,       |             |
|    | Encounter |  MED [facility] EMERGENCY   | Jerzy ORR MD  [address]  |             |
|        |           | CENTER  [address] [place]     |             |
|        |           |   [place]    | [place] [address] |             |
|        |           | [address]           |   [phone]       |             |
|        |           | [phone]         | [phone] (Fax)   |             |
+--------+-----------+----------------------+----------------------+-------------+
 
 
 
 Social History
 
 
+----------------+-------+-----------+--------+------+
| Tobacco Use    | Types | Packs/Day | Years  | Date |
|                |       |           | Used   |      |
+----------------+-------+-----------+--------+------+
| Never Assessed |       |           |        |      |
+----------------+-------+-----------+--------+------+
 
 
 
+------------------+---------------+
| Sex Assigned at  | Date Recorded |
| Birth            |               |
+------------------+---------------+
| Not on file      |               |
+------------------+---------------+
 
 
 
+----------------+-------------+-------------+
| Job Start Date | Occupation  | Industry    |
+----------------+-------------+-------------+
| Not on file    | Not on file | Not on file |
+----------------+-------------+-------------+
 
 
 
+----------------+--------------+------------+
| Travel History | Travel Start | Travel End |
+----------------+--------------+------------+
 
 
 
 
+-------------------------------------+
| No recent travel history available. |
+-------------------------------------+
 documented as of this encounter
 
 Plan of Treatment
 Not on filedocumented as of this encounter
 
 Visit Diagnoses
 Not on filedocumented in this encounter

## 2020-06-06 NOTE — XMS
Encounter Summary
  Created on: 2020
 
 Caverly, Paul Duane
 External Reference #: [id number]
 : 72
 Sex: Male
 
 Demographics
 
 
+-----------------------+-----------------------------+
| Address               | [address]                  |
|                       | [place]  [address] |
+-----------------------+-----------------------------+
| Home Phone            | [phone]             |
+-----------------------+-----------------------------+
| Preferred Language    | Unknown                     |
+-----------------------+-----------------------------+
| Marital Status        | Single                      |
+-----------------------+-----------------------------+
| Temple Affiliation | Unknown                     |
+-----------------------+-----------------------------+
| Race                  | Unknown                     |
+-----------------------+-----------------------------+
| Ethnic Group          | Unknown                     |
+-----------------------+-----------------------------+
 
 
 Author
 
 
+--------------+--------------------------------------------+
| Author       | [facility] [place]  |
|              | and [place]                                |
+--------------+--------------------------------------------+
| Organization | [facility] [place]  |
|              | and [place]                                |
+--------------+--------------------------------------------+
| Address      | Unknown                                    |
+--------------+--------------------------------------------+
| Phone        | Unavailable                                |
+--------------+--------------------------------------------+
 
 
 
 Support
 
 
+---------+--------------+---------+-----------------+
| Name    | Relationship | Address | Phone           |
+---------+--------------+---------+-----------------+
| Name No | ECON         | Unknown | [phone] |
+---------+--------------+---------+-----------------+
 
 
 
 Care Team Providers
 
 
 
+-----------------------+------+-------------+
| Care Team Member Name | Role | Phone       |
+-----------------------+------+-------------+
 PCP  | Unavailable |
+-----------------------+------+-------------+
 
 
 
 Encounter Details
 
 
+--------+-----------+----------------------+----------------------+-------------+
| Date   | Type      | Department           | Care Team            | Description |
+--------+-----------+----------------------+----------------------+-------------+
| / | Hospital  |   [facility] |   Estrellita Hightower  |             |
|    | Encounter |  MED [facility] EMERGENCY   | MD Christine [facility]  |             |
|        |           | CENTER  [address] | [address]  [place],  |             |
|        |           |   [place]    | [place] [address]             |             |
|        |           | [address]           | [phone]         |             |
|        |           | [phone]         | [phone] (Fax)   |             |
+--------+-----------+----------------------+----------------------+-------------+
 
 
 
 Social History
 
 
+----------------+-------+-----------+--------+------+
| Tobacco Use    | Types | Packs/Day | Years  | Date |
|                |       |           | Used   |      |
+----------------+-------+-----------+--------+------+
| Never Assessed |       |           |        |      |
+----------------+-------+-----------+--------+------+
 
 
 
+------------------+---------------+
| Sex Assigned at  | Date Recorded |
| Birth            |               |
+------------------+---------------+
| Not on file      |               |
+------------------+---------------+
 
 
 
+----------------+-------------+-------------+
| Job Start Date | Occupation  | Industry    |
+----------------+-------------+-------------+
| Not on file    | Not on file | Not on file |
+----------------+-------------+-------------+
 
 
 
+----------------+--------------+------------+
| Travel History | Travel Start | Travel End |
+----------------+--------------+------------+
 
 
 
 
+-------------------------------------+
| No recent travel history available. |
+-------------------------------------+
 documented as of this encounter
 
 Plan of Treatment
 Not on filedocumented as of this encounter
 
 Visit Diagnoses
 Not on filedocumented in this encounter

## 2020-06-06 NOTE — XMS
Encounter Summary
  Created on: 2020
 
 Caverly, Paul Duane
 External Reference #: [id number]
 : 72
 Sex: Male
 
 Demographics
 
 
+-----------------------+-----------------------------+
| Address               | [address]                  |
|                       | [place]  [address] |
+-----------------------+-----------------------------+
| Home Phone            | [phone]             |
+-----------------------+-----------------------------+
| Preferred Language    | Unknown                     |
+-----------------------+-----------------------------+
| Marital Status        | Single                      |
+-----------------------+-----------------------------+
| Catholic Affiliation | Unknown                     |
+-----------------------+-----------------------------+
| Race                  | Unknown                     |
+-----------------------+-----------------------------+
| Ethnic Group          | Unknown                     |
+-----------------------+-----------------------------+
 
 
 Author
 
 
+--------------+--------------------------------------------+
| Author       | [facility] [place]  |
|              | and [place]                                |
+--------------+--------------------------------------------+
| Organization | [facility]  |
|              | and [place]                                |
+--------------+--------------------------------------------+
| Address      | Unknown                                    |
+--------------+--------------------------------------------+
| Phone        | Unavailable                                |
+--------------+--------------------------------------------+
 
 
 
 Support
 
 
+---------+--------------+---------+-----------------+
| Name    | Relationship | Address | Phone           |
+---------+--------------+---------+-----------------+
| Name No | ECON         | Unknown | [phone] |
+---------+--------------+---------+-----------------+
 
 
 
 Care Team Providers
 
 
 
+-----------------------+------+-------------+
| Care Team Member Name | Role | Phone       |
+-----------------------+------+-------------+
 PCP  | Unavailable |
+-----------------------+------+-------------+
 
 
 
 Encounter Details
 
 
+--------+-----------+----------------------+---------------------+-------------+
| Date   | Type      | Department           | Care Team           | Description |
+--------+-----------+----------------------+---------------------+-------------+
| / | [facility]  |   [facility] |   Julia Vizcaino  |             |
|    | Encounter |  MED CTR XRAY  [address]     |             |
|        |           |  [address]       | [address]  [place]   |             |
|        |           | [place] [address] | [place] [address]     |             |
|        |           |   [phone]       | [phone]        |             |
|        |           |                      | [phone] (Fax)  |             |
+--------+-----------+----------------------+---------------------+-------------+
 
 
 
 Social History
 
 
+----------------+-------+-----------+--------+------+
| Tobacco Use    | Types | Packs/Day | Years  | Date |
|                |       |           | Used   |      |
+----------------+-------+-----------+--------+------+
| Never Assessed |       |           |        |      |
+----------------+-------+-----------+--------+------+
 
 
 
+------------------+---------------+
| Sex Assigned at  | Date Recorded |
| Birth            |               |
+------------------+---------------+
| Not on file      |               |
+------------------+---------------+
 
 
 
+----------------+-------------+-------------+
| Job Start Date | Occupation  | Industry    |
+----------------+-------------+-------------+
| Not on file    | Not on file | Not on file |
+----------------+-------------+-------------+
 
 
 
+----------------+--------------+------------+
| Travel History | Travel Start | Travel End |
+----------------+--------------+------------+
 
 
 
 
+-------------------------------------+
| No recent travel history available. |
+-------------------------------------+
 documented as of this encounter
 
 Plan of Treatment
 Not on filedocumented as of this encounter
 
 Visit Diagnoses
 Not on filedocumented in this encounter

## 2020-06-06 NOTE — XMS
Encounter Summary
  Created on: 2020
 
 Caverly, Paul Duane
 External Reference #: [id number]
 : 72
 Sex: Male
 
 Demographics
 
 
+-----------------------+-----------------------------+
| Address               | [address]                  |
|                       | [place]  [address] |
+-----------------------+-----------------------------+
| Home Phone            | [phone]             |
+-----------------------+-----------------------------+
| Preferred Language    | Unknown                     |
+-----------------------+-----------------------------+
| Marital Status        | Single                      |
+-----------------------+-----------------------------+
| Sabianism Affiliation | Unknown                     |
+-----------------------+-----------------------------+
| Race                  | Unknown                     |
+-----------------------+-----------------------------+
| Ethnic Group          | Unknown                     |
+-----------------------+-----------------------------+
 
 
 Author
 
 
+--------------+--------------------------------------------+
| Author       | [facility]  |
|              | and [place]                                |
+--------------+--------------------------------------------+
| Organization | [facility]  |
|              | and [place]                                |
+--------------+--------------------------------------------+
| Address      | Unknown                                    |
+--------------+--------------------------------------------+
| Phone        | Unavailable                                |
+--------------+--------------------------------------------+
 
 
 
 Support
 
 
+---------+--------------+---------+-----------------+
| Name    | Relationship | Address | Phone           |
+---------+--------------+---------+-----------------+
| Name No | ECON         | Unknown | [phone] |
+---------+--------------+---------+-----------------+
 
 
 
 Care Team Providers
 
 
 
+-----------------------+------+-------------+
| Care Team Member Name | Role | Phone       |
+-----------------------+------+-------------+
 PCP  | Unavailable |
+-----------------------+------+-------------+
 
 
 
 Encounter Details
 
 
+--------+-----------+----------------------+-----------+-------------+
| Date   | Type      | Department           | Care Team | Description |
+--------+-----------+----------------------+-----------+-------------+
| 05/10/ | Hospital  |   [facility] |           |             |
|    | Encounter |  [facility]   |           |             |
|        |           | CENTER  [address] |           |             |
|        |           |   [place]    |           |             |
|        |           | [address]           |           |             |
|        |           | [phone]         |           |             |
+--------+-----------+----------------------+-----------+-------------+
 
 
 
 Social History
 
 
+----------------+-------+-----------+--------+------+
| Tobacco Use    | Types | Packs/Day | Years  | Date |
|                |       |           | Used   |      |
+----------------+-------+-----------+--------+------+
| Never Assessed |       |           |        |      |
+----------------+-------+-----------+--------+------+
 
 
 
+------------------+---------------+
| Sex Assigned at  | Date Recorded |
| Birth            |               |
+------------------+---------------+
| Not on file      |               |
+------------------+---------------+
 
 
 
+----------------+-------------+-------------+
| Job Start Date | Occupation  | Industry    |
+----------------+-------------+-------------+
| Not on file    | Not on file | Not on file |
+----------------+-------------+-------------+
 
 
 
+----------------+--------------+------------+
| Travel History | Travel Start | Travel End |
+----------------+--------------+------------+
 
 
 
 
+-------------------------------------+
| No recent travel history available. |
+-------------------------------------+
 documented as of this encounter
 
 Plan of Treatment
 Not on filedocumented as of this encounter
 
 Visit Diagnoses
 Not on filedocumented in this encounter

## 2020-06-06 NOTE — XMS
Encounter Summary
  Created on: 2020
 
 Caverly, Paul Duane
 External Reference #: [id number]
 : 72
 Sex: Male
 
 Demographics
 
 
+-----------------------+-----------------------------+
| Address               | [address]                  |
|                       | [place]  [address] |
+-----------------------+-----------------------------+
| Home Phone            | [phone]             |
+-----------------------+-----------------------------+
| Preferred Language    | Unknown                     |
+-----------------------+-----------------------------+
| Marital Status        | Single                      |
+-----------------------+-----------------------------+
| Confucianism Affiliation | Unknown                     |
+-----------------------+-----------------------------+
| Race                  | Unknown                     |
+-----------------------+-----------------------------+
| Ethnic Group          | Unknown                     |
+-----------------------+-----------------------------+
 
 
 Author
 
 
+--------------+--------------------------------------------+
| Author       | [facility]  |
|              | and [place]                                |
+--------------+--------------------------------------------+
| Organization | [facility]  |
|              | and [place]                                |
+--------------+--------------------------------------------+
| Address      | Unknown                                    |
+--------------+--------------------------------------------+
| Phone        | Unavailable                                |
+--------------+--------------------------------------------+
 
 
 
 Support
 
 
+---------+--------------+---------+-----------------+
| Name    | Relationship | Address | Phone           |
+---------+--------------+---------+-----------------+
| Name No | ECON         | Unknown | [phone] |
+---------+--------------+---------+-----------------+
 
 
 
 Care Team Providers
 
 
 
+-----------------------+------+-------------+
| Care Team Member Name | Role | Phone       |
+-----------------------+------+-------------+
 PCP  | Unavailable |
+-----------------------+------+-------------+
 
 
 
 Encounter Details
 
 
+--------+-----------+----------------------+-----------+-------------+
| Date   | Type      | Department           | Care Team | Description |
+--------+-----------+----------------------+-----------+-------------+
| / | Hospital  |   [facility] |           |             |
|    | Encounter |  [facility] EM[facility]   |           |             |
|        |           | CENTER  [address] |           |             |
|        |           |   [place]    |           |             |
|        |           | [address]           |           |             |
|        |           | [phone]         |           |             |
+--------+-----------+----------------------+-----------+-------------+
 
 
 
 Social History
 
 
+----------------+-------+-----------+--------+------+
| Tobacco Use    | Types | Packs/Day | Years  | Date |
|                |       |           | Used   |      |
+----------------+-------+-----------+--------+------+
| Never Assessed |       |           |        |      |
+----------------+-------+-----------+--------+------+
 
 
 
+------------------+---------------+
| Sex Assigned at  | Date Recorded |
| Birth            |               |
+------------------+---------------+
| Not on file      |               |
+------------------+---------------+
 
 
 
+----------------+-------------+-------------+
| Job Start Date | Occupation  | Industry    |
+----------------+-------------+-------------+
| Not on file    | Not on file | Not on file |
+----------------+-------------+-------------+
 
 
 
+----------------+--------------+------------+
| Travel History | Travel Start | Travel End |
+----------------+--------------+------------+
 
 
 
 
+-------------------------------------+
| No recent travel history available. |
+-------------------------------------+
 documented as of this encounter
 
 Plan of Treatment
 Not on filedocumented as of this encounter
 
 Visit Diagnoses
 Not on filedocumented in this encounter

## 2020-06-06 NOTE — XMS
PreManage Notification: BECKI ORTIZ MRN:[id number]
 
Security Information
 
Security Events
No recent Security Events currently on file
 
 
 
CRITERIA MET
------------
- Group Notification
- [facility] - Has Care Guidelines
 
 
CARE PROVIDERS
-------------------------------------------------------------------------------------
GILBERTO HUFF      Physician Assistant     Current
 
PHONE: [phone]
-------------------------------------------------------------------------------------
STEPHEN PHELAN      Physician Assistant     Current
 
PHONE: Unknown
-------------------------------------------------------------------------------------
 
[facility] has no Care Guidelines for this patient.
Care History
Medical/Surgical
01/09/2019    [facility]
 
      - [facility] CALLED PATIENT 2X LEFT A VOICEMAIL. 
      - PATIENT NEEDS TO APPLY FOR MEDICAL BENEFITS- PLEASE CONTACT NICHELLE - 7616 TO HAVE PATIENT APPLY FOR BENEFITS.
      - PATIENT DOES NOT HAVE A PCP - PLEASE REFER PATIENT TO [facility] FOR 
      FOLLOW UP - TO ESTABLISH CARE.
      - W SENT NO PCP LETTER TO PATIENT.
E.D. VISIT COUNT (12 MO.)
-------------------------------------------------------------------------------------
2 [facility]
-------------------------------------------------------------------------------------
TOTAL 2
-------------------------------------------------------------------------------------
NOTE: Visits indicate total known visits.
 
ED/UCC VISIT TRACKING (12 MO.)
-------------------------------------------------------------------------------------
06/06/2020 07:53
[facility] [place]
 
TYPE: Emergency
 
COMPLAINT:
- FINGER PAIN
-------------------------------------------------------------------------------------
04/28/2020 13:46
[facility] [place]
 
TYPE: Emergency
 
 
COMPLAINT:
- RT FINGER INJURY
 
DIAGNOSES:
- Unspecified injury of right wrist, hand and finger(s), initia
- Personal history of nicotine dependence
- Allergy status to other antibiotic agents status
- Other foreign body or object entering through skin, initial e
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
-------------------------------------------------------------------------------------
 
 
INPATIENT VISIT TRACKING (12 MO.)
No inpatient visits to display in this time frame
 
[email or web address]

## 2020-06-06 NOTE — XMS
Encounter Summary
  Created on: 2020
 
 Caverly, Paul Duane
 External Reference #: [id number]
 : 72
 Sex: Male
 
 Demographics
 
 
+-----------------------+-----------------------------+
| Address               | [address]                  |
|                       | [place]  [address] |
+-----------------------+-----------------------------+
| Home Phone            | [phone]             |
+-----------------------+-----------------------------+
| Preferred Language    | Unknown                     |
+-----------------------+-----------------------------+
| Marital Status        | Single                      |
+-----------------------+-----------------------------+
| Shinto Affiliation | Unknown                     |
+-----------------------+-----------------------------+
| Race                  | Unknown                     |
+-----------------------+-----------------------------+
| Ethnic Group          | Unknown                     |
+-----------------------+-----------------------------+
 
 
 Author
 
 
+--------------+--------------------------------------------+
| Author       | [facility]  |
|              | and [place]                                |
+--------------+--------------------------------------------+
| Organization | [facility]  |
|              | and [place]                                |
+--------------+--------------------------------------------+
| Address      | Unknown                                    |
+--------------+--------------------------------------------+
| Phone        | Unavailable                                |
+--------------+--------------------------------------------+
 
 
 
 Support
 
 
+---------+--------------+---------+-----------------+
| Name    | Relationship | Address | Phone           |
+---------+--------------+---------+-----------------+
| Name No | ECON         | Unknown | [phone] |
+---------+--------------+---------+-----------------+
 
 
 
 Care Team Providers
 
 
 
+-----------------------+------+-------------+
| Care Team Member Name | Role | Phone       |
+-----------------------+------+-------------+
 PCP  | Unavailable |
+-----------------------+------+-------------+
 
 
 
 Encounter Details
 
 
+--------+-----------+----------------------+--------------------+-------------+
| Date   | Type      | Department           | Care Team          | Description |
+--------+-----------+----------------------+--------------------+-------------+
| / | [facility]  |   [facility] |   Hayden Farris   |             |
|    | Encounter |  [facility] EM[facility]   | [facility]  [address]   |             |
|        |           | CENTER  [address] | [place]   [place]   |             |
|        |           |   [place]    | [place] [address]    |             |
|        |           | [address]           | [phone]       |             |
|        |           | [phone]         | [phone] (Fax) |             |
+--------+-----------+----------------------+--------------------+-------------+
 
 
 
 Social History
 
 
+----------------+-------+-----------+--------+------+
| Tobacco Use    | Types | Packs/Day | Years  | Date |
|                |       |           | Used   |      |
+----------------+-------+-----------+--------+------+
| Never Assessed |       |           |        |      |
+----------------+-------+-----------+--------+------+
 
 
 
+------------------+---------------+
| Sex Assigned at  | Date Recorded |
| Birth            |               |
+------------------+---------------+
| Not on file      |               |
+------------------+---------------+
 
 
 
+----------------+-------------+-------------+
| Job Start Date | Occupation  | Industry    |
+----------------+-------------+-------------+
| Not on file    | Not on file | Not on file |
+----------------+-------------+-------------+
 
 
 
+----------------+--------------+------------+
| Travel History | Travel Start | Travel End |
+----------------+--------------+------------+
 
 
 
 
+-------------------------------------+
| No recent travel history available. |
+-------------------------------------+
 documented as of this encounter
 
 Plan of Treatment
 Not on filedocumented as of this encounter
 
 Visit Diagnoses
 Not on filedocumented in this encounter

## 2020-06-06 NOTE — XMS
Encounter Summary
  Created on: 2020
 
 Caverly, Paul Duane
 External Reference #: [id number]
 : 72
 Sex: Male
 
 Demographics
 
 
+-----------------------+-----------------------------+
| Address               | [address]                  |
|                       | [place]  [address] |
+-----------------------+-----------------------------+
| Home Phone            | [phone]             |
+-----------------------+-----------------------------+
| Preferred Language    | Unknown                     |
+-----------------------+-----------------------------+
| Marital Status        | Single                      |
+-----------------------+-----------------------------+
| Scientologist Affiliation | Unknown                     |
+-----------------------+-----------------------------+
| Race                  | Unknown                     |
+-----------------------+-----------------------------+
| Ethnic Group          | Unknown                     |
+-----------------------+-----------------------------+
 
 
 Author
 
 
+--------------+--------------------------------------------+
| Author       | [facility]  |
|              | and [place]                                |
+--------------+--------------------------------------------+
| Organization | [facility]  |
|              | and [place]                                |
+--------------+--------------------------------------------+
| Address      | Unknown                                    |
+--------------+--------------------------------------------+
| Phone        | Unavailable                                |
+--------------+--------------------------------------------+
 
 
 
 Support
 
 
+---------+--------------+---------+-----------------+
| Name    | Relationship | Address | Phone           |
+---------+--------------+---------+-----------------+
| Name No | ECON         | Unknown | [phone] |
+---------+--------------+---------+-----------------+
 
 
 
 Care Team Providers
 
 
 
+-----------------------+------+-------------+
| Care Team Member Name | Role | Phone       |
+-----------------------+------+-------------+
| No, Physician         | PCP  | Unavailable |
+-----------------------+------+-------------+
 
 
 
 Reason for Visit
 
 
+---------------+-----------------------------+
| Reason        | Comments                    |
+---------------+-----------------------------+
| Hand Swelling | room 2/ right hand x 3 days |
+---------------+-----------------------------+
 
 
 
 Encounter Details
 
 
+--------+---------+----------------------+----------------------+----------------------+
| Date   | Type    | Department           | Care Team            | Description          |
+--------+---------+----------------------+----------------------+----------------------+
| / | Office  |   [facility]   |   Damian Vidal | Cellulitis of right  |
| 2018   | Visit   | CARE  [address] |  MD [place]  [address]   | hand (Primary Dx)    |
|        |         |   [place]    | [address]  [place] |                      |
|        |         | [address]           |  [address]  [phone] |                      |
|        |         | [phone]         |   [phone] (Fax) |                      |
+--------+---------+----------------------+----------------------+----------------------+
 
 
 
 Social History
 
 
+--------------------+-------+-----------+--------+------+
| Tobacco Use        | Types | Packs/Day | Years  | Date |
|                    |       |           | Used   |      |
+--------------------+-------+-----------+--------+------+
| Current Every Day  |       |           |        |      |
| Smoker             |       |           |        |      |
+--------------------+-------+-----------+--------+------+
 
 
 
+---------------------+---+---+---+
| Smokeless Tobacco:  |   |   |   |
| Never Used          |   |   |   |
+---------------------+---+---+---+
 
 
 
+------------------+---------------+
| Sex Assigned at  | Date Recorded |
| Birth            |               |
+------------------+---------------+
 
| Not on file      |               |
+------------------+---------------+
 
 
 
+----------------+-------------+-------------+
| Job Start Date | Occupation  | Industry    |
+----------------+-------------+-------------+
| Not on file    | Not on file | Not on file |
+----------------+-------------+-------------+
 
 
 
+----------------+--------------+------------+
| Travel History | Travel Start | Travel End |
+----------------+--------------+------------+
 
 
 
+-------------------------------------+
| No recent travel history available. |
+-------------------------------------+
 documented as of this encounter
 
 Last Filed Vital Signs
 
 
+-------------------+----------------------+----------------------+----------+
| Vital Sign        | Reading              | Time Taken           | Comments |
+-------------------+----------------------+----------------------+----------+
| Blood Pressure    | 124/84               | 2018  1:11 PM  |          |
|                   |                      | PST                  |          |
+-------------------+----------------------+----------------------+----------+
| Pulse             | 103                  | 2018  1:11 PM  |          |
|                   |                      | PST                  |          |
+-------------------+----------------------+----------------------+----------+
| Temperature       | 36.3   C (97.4   F)  | 2018  1:11 PM  |          |
|                   |                      | PST                  |          |
+-------------------+----------------------+----------------------+----------+
| Respiratory Rate  | 16                   | 2018  1:11 PM  |          |
|                   |                      | PST                  |          |
+-------------------+----------------------+----------------------+----------+
| Oxygen Saturation | 99%                  | 2018  1:11 PM  |          |
|                   |                      | PST                  |          |
+-------------------+----------------------+----------------------+----------+
| Inhaled Oxygen    | -                    | -                    |          |
| Concentration     |                      |                      |          |
+-------------------+----------------------+----------------------+----------+
| Weight            | 87.3 kg (192 lb 7.4  | 2018  1:11 PM  |          |
|                   | oz)                  | PST                  |          |
+-------------------+----------------------+----------------------+----------+
| Height            | 185.4 cm (6' 1")     | 2018  1:11 PM  |          |
|                   |                      | PST                  |          |
+-------------------+----------------------+----------------------+----------+
| Body Mass Index   | 25.39                | 2018  1:11 PM  |          |
|                   |                      | PST                  |          |
+-------------------+----------------------+----------------------+----------+
 documented in this encounter
 
 Patient Instructions
 
 Patient Instructions Damian Vidal MD - 2018 12:45 PM PSTRocephin shot was pr
ovided for infection.
 Take Keflex 500 mg 4 times daily for 10 days for skin infection.
 If you develop an itchy rash, hives, lip swelling or wheezing, stop Keflex and report to [facility] emergency room.
 Take Naprosyn 500 mg, 1 tablet with food twice daily for 5 days, then as needed for pain an
d swelling.
 Take tramadol 50 mg, one tablet up to 3 times daily as needed for breakthrough pain.
 The right hand and arm above the level of your heart as needed for pain and swelling.
 Use warm, moist compresses on the hand and forearm for 15 minutes at a time 3 times daily u
ntil improved.
 Stay home from work avoiding repetitive gripping, grasping and lifting with the right hand 
until symptoms have resolved.
 Return for reevaluation of your hand and forearm tomorrow.
 Report to emergency room if he developed progressive pain, redness, swelling, nausea, vomit
ing or other new rapidly progressive associated symptoms.
 
 Cellulitis
 Cellulitis is an infection of the deep layers of skin. A break in the skin, such as a cut o
r scratch, can let bacteria under the skin. If the bacteria get to deep layers of the skin, 
it can be serious. If not treated, cellulitis can get into the bloodstream and lymph nodes. 
The infection can then spread throughout the body. This causes serious illness.
 Cellulitis causes the affected skin to become red, swollen, warm, and sore. The reddened ar
eas have a visible border. An open sore may leak fluid (pus). You may have a fever, chills, 
and pain.
 Cellulitis is treated with antibiotics taken for 7 to 10 days. An open sore may be cleaned 
and covered with cool wet gauze. Symptoms should get better 1 to 2 days after treatment is s
tarted. Make sure to take all the antibiotics for the full number of days until they are apryl
e. Keep taking the medicine even if your symptoms go away.
 Home care
 Follow these tips:
  Limit the use of the part of your body with cellulitis.
  If the infection is on your leg, keep your leg raised while sitting. This will help to r
educe swelling.
  Take all of the antibiotic medicine exactly as directed until it is gone. Do not miss an
y doses, especially during the first 7 days. Don  t stop taking the medicine when your symp
toms get better.
  Keep the affected area clean and dry.
  Wash your hands with soap and warm water before and after touching your skin. Anyone els
e who touches your skin should also wash his or her hands. Don't share towels.
 Follow-up care
 Follow up with your healthcare provider, or as advised. If your infection does not go away 
on the first antibiotic, your healthcare provider will prescribe a different one.
 When to seek medical advice
 Call your healthcare provider right away if any of these occur:
  Red areas that spread
  Swelling or pain that gets worse
  Fluid leaking from the skin (pus)
  Fever higher of 100.4 F (38.0 C) or higher after 2 days on antibiotics
 Date Last Reviewed: 2016-2017 [employer]. [address] [place] [address]. All righ
ts reserved. This information is not intended as a substitute for professional medical care.
 Always follow your healthcare professional's instructions.
 
 Electronically signed by Damian Vidal MD at 2018  2:31 PM PST
 documented in this encounter
 
 Progress Notes
 Mandy Lazaro RN - 2018 12:45 PM PSTFormatting of this note might be different
 from the original.
 
 Administrations This Visit  
  cefTRIAXone (ROCEPHIN) injection 1 g  
  Admin Date
 2018 Action
 Given Dose
 1 g Route
 Intramuscular Administered By
 Mandy Lazaro RN 
  
  
  lidocaine 1% injection 2.1 mL  
  Admin Date
 2018 Action
 Given Dose
 2.1 mL Route
 Intramuscular Administered By
 Mandy Lazaro RN 
  
  
  
 
 Pt tolerated well. Mandy Lazaro RN
 Electronically signed by Mandy Lazaro RN at 2018  2:55 PM Winston, Antonietta AGUIRRE MD - 2018 12:45 PM PSTFormatting of this note might be different from the origina
l.
 3/7/2018
 
 Paul Duane Caverly
 1970
 
 Assessment:
 1. Cellulitis of right hand  cefTRIAXone (ROCEPHIN) injection 1 g 
  lidocaine 1% injection 2.1 mL 
  naproxen (NAPROSYN) 500 mg tablet 
  traMADol (ULTRAM) 50 mg tablet 
  cephalexin (KEFLEX) 500 mg capsule 
 
 Three-day history of progressive cellulitis on the back of the right hand associated with m
ultiple small abrasions on the hand, a new tattoo on the right forearm placed 10 days ago.  
No evidence of septic arthritis or systemic symptoms.  He agrees with treatment with IM Roce
phin after discussing the risks and benefits given his penicillin allergy.  We discussed the
 option of starting oral clindamycin or waiting for intravenous dose of alternative antibiot
ic.  He opted for the Rocephin IM.  He was given the following instructions, prescriptions a
nd a note for work.  Follow-up here tomorrow is anticipated.
 
 Plan:
 
 Rocephin shot was provided for infection.
 Take Keflex 500 mg 4 times daily for 10 days for skin infection.
 If you develop an itchy rash, hives, lip swelling or wheezing, stop Keflex and report to [facility] emergency room.
 Take Naprosyn 500 mg, 1 tablet with food twice daily for 5 days, then as needed for pain an
d swelling.
 Take tramadol 50 mg, one tablet up to 3 times daily as needed for breakthrough pain.
 The right hand and arm above the level of your heart as needed for pain and swelling.
 Use warm, moist compresses on the hand and forearm for 15 minutes at a time 3 times daily u
ntil improved.
 Stay home from work avoiding repetitive gripping, grasping and lifting with the right hand 
until symptoms have resolved.
 Return for reevaluation of your hand and forearm tomorrow.
 
 Report to emergency room if he developed progressive pain, redness, swelling, nausea, vomit
ing or other new rapidly progressive associated symptoms.
 
 The risks and benefits, including potential side effects of medication changes, have been d
iscussed with the patient.  We agreed on implementing the current plan.
 The note may have been dictated using Dragon voice recognition software.  It may have not b
een proofread in entirety.  Minor errors in grammar may occur.
 
 History:
 Chief Complaint 
 Patient presents with 
   Hand Swelling 
   room 2/ right hand x 3 days 
 
 Paul Duane Caverly is a 48 y.o. male here for painful redness and swelling on the back of h
is right hand extending onto his forearm starting 3 days ago.  He is a  and frequently
 cuts his fingers and hands in the process of daily work.  He had a tattoo placed on the vol
ar aspect of his right forearm 10 days ago which has been without redness, swelling and drai
nage.  He had difficulty sleeping last night applying warm compress and elevating his arm.  
He went to work today and cannot due to pain with use of the hand and arm.  Denies other inj
ury to the right hand, no history of trauma. History of cellulitis of the left lower leg one
 month ago treated at [facility] with resolution, doesn't recall the antibiotic.  He
 denies fever but feels intermittent chills.  Mild nausea without vomiting.  He has eaten to
day with normal bowel movement.  No history of diabetes mellitus or MRSA infection.
 
 Current medications, past medical, surgical, family and social histories were reviewed and 
updated where appropriate.
 
 Allergies 
 Allergen Reactions 
   Amoxicillin Hives 
 
 Physical Exam:
 /84  | Pulse 103  | Temp 36.3 C (97.4 F) (Temporal)  | Resp 16  | Ht 1.854 m (6' 
1")  | Wt 87.3 kg (192 lb 7.4 oz)  | SpO2 99%  | BMI 25.39 kg/m 
 General: Well appearing, middle-aged male in no distress and appears stated age.
 Skin: Multiple, small, scabbed abrasions on multiple fingers and the back of his right hand
.  No pustules, fluctuance or purulent drainage.  Fingers are callused.  Diffusely erythemat
ous, tender, warm and edematous skin on back of his hand without fluctuance or ecchymosis.  
Mild erythema extends onto the dorsal aspect of the wrist and forearm with several small fol
licular pustules noted on the proximal forearm.  Again, no fluctuance or drainage.  New tatt
oo noted on the proximal, volar forearm without inflammation or drainage.
 Musculoskeletal: Opens and closes the fist completely with minimal pain.  Normal finger spl
ay.  Range of motion of the right wrist and elbow are complete with minimal discomfort.
 Lymphatic: Perhaps mild lymphangitis on the dorsum of the right forearm.  No antecubital or
 axillary adenopathy.
 Neurovascular: Intact radial pulses.  Light touch sensation and capillary refill intact thr
oughout the right hand.
 
 Damian Vidal M.D.
 
 Electronically signed by Damian Vidal MD at 2018  2:55 PM PSTdocumented in 
is encounter
 
 Plan of Treatment
 Not on filedocumented as of this encounter
 
 Visit Diagnoses
 
 
 
+------------------------------------------------------------------------------------------+
| Diagnosis                                                                                |
+------------------------------------------------------------------------------------------+
|   Cellulitis of right hand - Primary  Cellulitis and abscess of hand, except fingers and |
|  thumb                                                                                   |
+------------------------------------------------------------------------------------------+
 documented in this encounter
 
 Administered Medications
 
 
+-----------------------------------+--------+----------+------+------+----------+
| Medication Order                  | MAR    | Action   | Dose | Rate | Site     |
|                                   | Action | Date     |      |      |          |
+-----------------------------------+--------+----------+------+------+----------+
|   cefTRIAXone (ROCEPHIN)          | Given  | 20 | 1 g  |      | Ventrogl |
| injection 1 g  1 g,               |        | 18  2:15 |      |      | uteal-Le |
| Intramuscular, ONCE, Wed 3/7/18   |        |  PM PST  |      |      | ft       |
| at 1430, For 1 dose, MIX WITH 2.1 |        |          |      |      |          |
|  ML LIDOCAINE, Indications:       |        |          |      |      |          |
| PURULENT SKIN AND SOFT TISSUE     |        |          |      |      |          |
| INFECTION                         |        |          |      |      |          |
+-----------------------------------+--------+----------+------+------+----------+
 
 
 
+---+---+
|   |   |
+---+---+
 
 
 
+-----------------------------------+-------+----------+---------+---+----------+
|   lidocaine 1% injection 2.1 mL   | Given | 20 | 2.1 mLs |   | Ventrogl |
| 2.1 mL, Intramuscular, ONCE, Wed  |       | 18  2:18 |         |   | uteal-Le |
| 3/7/18 at 1430, For 1 dose        |       |  PM PST  |         |   | ft       |
+-----------------------------------+-------+----------+---------+---+----------+
 
 
 
+---+---+
|   |   |
+---+---+
 documented in this encounter

## 2020-06-06 NOTE — XMS
Encounter Summary
  Created on: 2020
 
 Caverly, Paul Duane
 External Reference #: [id number]
 : 72
 Sex: Male
 
 Demographics
 
 
+-----------------------+-----------------------------+
| Address               | [address]                  |
|                       | [place]  [address] |
+-----------------------+-----------------------------+
| Home Phone            | [phone]             |
+-----------------------+-----------------------------+
| Preferred Language    | Unknown                     |
+-----------------------+-----------------------------+
| Marital Status        | Single                      |
+-----------------------+-----------------------------+
| Mu-ism Affiliation | Unknown                     |
+-----------------------+-----------------------------+
| Race                  | Unknown                     |
+-----------------------+-----------------------------+
| Ethnic Group          | Unknown                     |
+-----------------------+-----------------------------+
 
 
 Author
 
 
+--------------+--------------------------------------------+
| Author       | [facility]  |
|              | and [place]                                |
+--------------+--------------------------------------------+
| Organization | [facility]  |
|              | and [place]                                |
+--------------+--------------------------------------------+
| Address      | Unknown                                    |
+--------------+--------------------------------------------+
| Phone        | Unavailable                                |
+--------------+--------------------------------------------+
 
 
 
 Support
 
 
+---------+--------------+---------+-----------------+
| Name    | Relationship | Address | Phone           |
+---------+--------------+---------+-----------------+
| Name No | ECON         | Unknown | [phone] |
+---------+--------------+---------+-----------------+
 
 
 
 Care Team Providers
 
 
 
+-----------------------+------+-------------+
| Care Team Member Name | Role | Phone       |
+-----------------------+------+-------------+
 PCP  | Unavailable |
+-----------------------+------+-------------+
 
 
 
 Encounter Details
 
 
+--------+-----------+----------------------+-----------+-------------+
| Date   | Type      | Department           | Care Team | Description |
+--------+-----------+----------------------+-----------+-------------+
| / | Hospital  |   [facility] |           |             |
|    | Encounter |  MED CTR XRAY  [address] |           |             |
|        |           |  [address]       |           |             |
|        |           | [place] [address] |           |             |
|        |           |   [phone]       |           |             |
+--------+-----------+----------------------+-----------+-------------+
 
 
 
 Social History
 
 
+----------------+-------+-----------+--------+------+
| Tobacco Use    | Types | Packs/Day | Years  | Date |
|                |       |           | Used   |      |
+----------------+-------+-----------+--------+------+
| Never Assessed |       |           |        |      |
+----------------+-------+-----------+--------+------+
 
 
 
+------------------+---------------+
| Sex Assigned at  | Date Recorded |
| Birth            |               |
+------------------+---------------+
| Not on file      |               |
+------------------+---------------+
 
 
 
+----------------+-------------+-------------+
| Job Start Date | Occupation  | Industry    |
+----------------+-------------+-------------+
| Not on file    | Not on file | Not on file |
+----------------+-------------+-------------+
 
 
 
+----------------+--------------+------------+
| Travel History | Travel Start | Travel End |
+----------------+--------------+------------+
 
 
 
 
+-------------------------------------+
| No recent travel history available. |
+-------------------------------------+
 documented as of this encounter
 
 Plan of Treatment
 Not on filedocumented as of this encounter
 
 Visit Diagnoses
 Not on filedocumented in this encounter

## 2020-06-06 NOTE — XMS
Encounter Summary
  Created on: 2020
 
 Caverly, Paul Duane
 External Reference #: [id number]
 : 72
 Sex: Male
 
 Demographics
 
 
+-----------------------+-----------------------------+
| Address               | [address]                  |
|                       | [place]  [address] |
+-----------------------+-----------------------------+
| Home Phone            | [phone]             |
+-----------------------+-----------------------------+
| Preferred Language    | Unknown                     |
+-----------------------+-----------------------------+
| Marital Status        | Single                      |
+-----------------------+-----------------------------+
| Restorationism Affiliation | Unknown                     |
+-----------------------+-----------------------------+
| Race                  | Unknown                     |
+-----------------------+-----------------------------+
| Ethnic Group          | Unknown                     |
+-----------------------+-----------------------------+
 
 
 Author
 
 
+--------------+--------------------------------------------+
| Author       | [facility] [place]  |
|              | and [place]                                |
+--------------+--------------------------------------------+
| Organization | [facility] [place]  |
|              | and [place]                                |
+--------------+--------------------------------------------+
| Address      | Unknown                                    |
+--------------+--------------------------------------------+
| Phone        | Unavailable                                |
+--------------+--------------------------------------------+
 
 
 
 Support
 
 
+---------+--------------+---------+-----------------+
| Name    | Relationship | Address | Phone           |
+---------+--------------+---------+-----------------+
| Name No | ECON         | Unknown | [phone] |
+---------+--------------+---------+-----------------+
 
 
 
 Care Team Providers
 
 
 
+-----------------------+------+-------------+
| Care Team Member Name | Role | Phone       |
+-----------------------+------+-------------+
 PCP  | Unavailable |
+-----------------------+------+-------------+
 
 
 
 Encounter Details
 
 
+--------+-----------+----------------------+--------------------+-------------+
| Date   | Type      | Department           | Care Team          | Description |
+--------+-----------+----------------------+--------------------+-------------+
| / | [facility]  |   [facility] |   Hayden Farris   |             |
|    | Encounter |  MED CTR EMERGENCY   | MD [address]   |             |
|        |           | CENTER  [address] | [place]   [place]   |             |
|        |           |   [place]    | [place] [address]    |             |
|        |           | [address]           | [phone]       |             |
|        |           | [phone]         | [phone] (Fax) |             |
+--------+-----------+----------------------+--------------------+-------------+
 
 
 
 Social History
 
 
+----------------+-------+-----------+--------+------+
| Tobacco Use    | Types | Packs/Day | Years  | Date |
|                |       |           | Used   |      |
+----------------+-------+-----------+--------+------+
| Never Assessed |       |           |        |      |
+----------------+-------+-----------+--------+------+
 
 
 
+------------------+---------------+
| Sex Assigned at  | Date Recorded |
| Birth            |               |
+------------------+---------------+
| Not on file      |               |
+------------------+---------------+
 
 
 
+----------------+-------------+-------------+
| Job Start Date | Occupation  | Industry    |
+----------------+-------------+-------------+
| Not on file    | Not on file | Not on file |
+----------------+-------------+-------------+
 
 
 
+----------------+--------------+------------+
| Travel History | Travel Start | Travel End |
+----------------+--------------+------------+
 
 
 
 
+-------------------------------------+
| No recent travel history available. |
+-------------------------------------+
 documented as of this encounter
 
 Plan of Treatment
 Not on filedocumented as of this encounter
 
 Visit Diagnoses
 Not on filedocumented in this encounter

## 2020-06-06 NOTE — XMS
Encounter Summary
  Created on: 2020
 
 Caverly, Paul Duane
 External Reference #: [id number]
 : 72
 Sex: Male
 
 Demographics
 
 
+-----------------------+-----------------------------+
| Address               | [address]                  |
|                       | [place]  [address] |
+-----------------------+-----------------------------+
| Home Phone            | [phone]             |
+-----------------------+-----------------------------+
| Preferred Language    | Unknown                     |
+-----------------------+-----------------------------+
| Marital Status        | Single                      |
+-----------------------+-----------------------------+
| Baptism Affiliation | Unknown                     |
+-----------------------+-----------------------------+
| Race                  | Unknown                     |
+-----------------------+-----------------------------+
| Ethnic Group          | Unknown                     |
+-----------------------+-----------------------------+
 
 
 Author
 
 
+--------------+--------------------------------------------+
| Author       | [facility] [place]  |
|              | and [place]                                |
+--------------+--------------------------------------------+
| Organization | [facility] [place]  |
|              | and [place]                                |
+--------------+--------------------------------------------+
| Address      | Unknown                                    |
+--------------+--------------------------------------------+
| Phone        | Unavailable                                |
+--------------+--------------------------------------------+
 
 
 
 Support
 
 
+---------+--------------+---------+-----------------+
| Name    | Relationship | Address | Phone           |
+---------+--------------+---------+-----------------+
| Name No | ECON         | Unknown | [phone] |
+---------+--------------+---------+-----------------+
 
 
 
 Care Team Providers
 
 
 
+-----------------------+------+-------------+
| Care Team Member Name | Role | Phone       |
+-----------------------+------+-------------+
 PCP  | Unavailable |
+-----------------------+------+-------------+
 
 
 
 Encounter Details
 
 
+--------+-----------+----------------------+-----------+-------------+
| Date   | Type      | Department           | Care Team | Description |
+--------+-----------+----------------------+-----------+-------------+
| 05/10/ | Hospital  |   [facility] |           |             |
|    | Encounter |  MED CTR EMERGENCY   |           |             |
|        |           | CENTER  [address] |           |             |
|        |           |   [place]    |           |             |
|        |           | [address]           |           |             |
|        |           | [phone]         |           |             |
+--------+-----------+----------------------+-----------+-------------+
 
 
 
 Social History
 
 
+----------------+-------+-----------+--------+------+
| Tobacco Use    | Types | Packs/Day | Years  | Date |
|                |       |           | Used   |      |
+----------------+-------+-----------+--------+------+
| Never Assessed |       |           |        |      |
+----------------+-------+-----------+--------+------+
 
 
 
+------------------+---------------+
| Sex Assigned at  | Date Recorded |
| Birth            |               |
+------------------+---------------+
| Not on file      |               |
+------------------+---------------+
 
 
 
+----------------+-------------+-------------+
| Job Start Date | Occupation  | Industry    |
+----------------+-------------+-------------+
| Not on file    | Not on file | Not on file |
+----------------+-------------+-------------+
 
 
 
+----------------+--------------+------------+
| Travel History | Travel Start | Travel End |
+----------------+--------------+------------+
 
 
 
 
+-------------------------------------+
| No recent travel history available. |
+-------------------------------------+
 documented as of this encounter
 
 Plan of Treatment
 Not on filedocumented as of this encounter
 
 Visit Diagnoses
 Not on filedocumented in this encounter

## 2020-06-06 NOTE — XMS
Encounter Summary
  Created on: 2020
 
 Caverly, Paul Duane
 External Reference #: [id number]
 : 72
 Sex: Male
 
 Demographics
 
 
+-----------------------+-----------------------------+
| Address               | [address]                  |
|                       | [place]  [address] |
+-----------------------+-----------------------------+
| Home Phone            | [phone]             |
+-----------------------+-----------------------------+
| Preferred Language    | Unknown                     |
+-----------------------+-----------------------------+
| Marital Status        | Single                      |
+-----------------------+-----------------------------+
| Jew Affiliation | Unknown                     |
+-----------------------+-----------------------------+
| Race                  | Unknown                     |
+-----------------------+-----------------------------+
| Ethnic Group          | Unknown                     |
+-----------------------+-----------------------------+
 
 
 Author
 
 
+--------------+--------------------------------------------+
| Author       | [facility] [place]  |
|              | and [place]                                |
+--------------+--------------------------------------------+
| Organization | [facility] [place]  |
|              | and [place]                                |
+--------------+--------------------------------------------+
| Address      | Unknown                                    |
+--------------+--------------------------------------------+
| Phone        | Unavailable                                |
+--------------+--------------------------------------------+
 
 
 
 Support
 
 
+---------+--------------+---------+-----------------+
| Name    | Relationship | Address | Phone           |
+---------+--------------+---------+-----------------+
| Name No | ECON         | Unknown | [phone] |
+---------+--------------+---------+-----------------+
 
 
 
 Care Team Providers
 
 
 
+-----------------------+------+-------------+
| Care Team Member Name | Role | Phone       |
+-----------------------+------+-------------+
 PCP  | Unavailable |
+-----------------------+------+-------------+
 
 
 
 Encounter Details
 
 
+--------+-----------+----------------------+-------------------+-------------+
| Date   | Type      | Department           | Care Team         | Description |
+--------+-----------+----------------------+-------------------+-------------+
| 10/04/ | [facility]  |   [facility] |   Parvez Vizcaino  |             |
|    | Encounter |  MED CTR XRAY  [address] | [address]  |             |
|        |           |  [address] [place]       | updated address   |             |
|        |           | [place] [address] |                   |             |
|        |           |   [phone]       |                   |             |
+--------+-----------+----------------------+-------------------+-------------+
 
 
 
 Social History
 
 
+----------------+-------+-----------+--------+------+
| Tobacco Use    | Types | Packs/Day | Years  | Date |
|                |       |           | Used   |      |
+----------------+-------+-----------+--------+------+
| Never Assessed |       |           |        |      |
+----------------+-------+-----------+--------+------+
 
 
 
+------------------+---------------+
| Sex Assigned at  | Date Recorded |
| Birth            |               |
+------------------+---------------+
| Not on file      |               |
+------------------+---------------+
 
 
 
+----------------+-------------+-------------+
| Job Start Date | Occupation  | Industry    |
+----------------+-------------+-------------+
| Not on file    | Not on file | Not on file |
+----------------+-------------+-------------+
 
 
 
+----------------+--------------+------------+
| Travel History | Travel Start | Travel End |
+----------------+--------------+------------+
 
 
 
 
+-------------------------------------+
| No recent travel history available. |
+-------------------------------------+
 documented as of this encounter
 
 Plan of Treatment
 Not on filedocumented as of this encounter
 
 Visit Diagnoses
 Not on filedocumented in this encounter

## 2020-06-06 NOTE — XMS
Encounter Summary
  Created on: 2020
 
 Caverly, Paul Duane
 External Reference #: [id number]
 : 72
 Sex: Male
 
 Demographics
 
 
+-----------------------+-----------------------------+
| Address               | [address]                  |
|                       | [place]  [address] |
+-----------------------+-----------------------------+
| Home Phone            | [phone]             |
+-----------------------+-----------------------------+
| Preferred Language    | Unknown                     |
+-----------------------+-----------------------------+
| Marital Status        | Single                      |
+-----------------------+-----------------------------+
| Yazidi Affiliation | Unknown                     |
+-----------------------+-----------------------------+
| Race                  | Unknown                     |
+-----------------------+-----------------------------+
| Ethnic Group          | Unknown                     |
+-----------------------+-----------------------------+
 
 
 Author
 
 
+--------------+--------------------------------------------+
| Author       | [facility] [place]  |
|              | and [place]                                |
+--------------+--------------------------------------------+
| Organization | [facility] [place]  |
|              | and [place]                                |
+--------------+--------------------------------------------+
| Address      | Unknown                                    |
+--------------+--------------------------------------------+
| Phone        | Unavailable                                |
+--------------+--------------------------------------------+
 
 
 
 Support
 
 
+---------+--------------+---------+-----------------+
| Name    | Relationship | Address | Phone           |
+---------+--------------+---------+-----------------+
| Name No | ECON         | Unknown | [phone] |
+---------+--------------+---------+-----------------+
 
 
 
 Care Team Providers
 
 
 
+-----------------------+------+-------------+
| Care Team Member Name | Role | Phone       |
+-----------------------+------+-------------+
 PCP  | Unavailable |
+-----------------------+------+-------------+
 
 
 
 Encounter Details
 
 
+--------+-----------+----------------------+-------------------+-------------+
| Date   | Type      | Department           | Care Team         | Description |
+--------+-----------+----------------------+-------------------+-------------+
| / | [facility]  |   [facility] |   Parvez Vizcaino  |             |
|    | Encounter |  MED CTR XRAY  [address] | [address]  |             |
|        |           |  [place]       | updated address   |             |
|        |           | [place] [address] |                   |             |
|        |           |   [phone]       |                   |             |
+--------+-----------+----------------------+-------------------+-------------+
 
 
 
 Social History
 
 
+----------------+-------+-----------+--------+------+
| Tobacco Use    | Types | Packs/Day | Years  | Date |
|                |       |           | Used   |      |
+----------------+-------+-----------+--------+------+
| Never Assessed |       |           |        |      |
+----------------+-------+-----------+--------+------+
 
 
 
+------------------+---------------+
| Sex Assigned at  | Date Recorded |
| Birth            |               |
+------------------+---------------+
| Not on file      |               |
+------------------+---------------+
 
 
 
+----------------+-------------+-------------+
| Job Start Date | Occupation  | Industry    |
+----------------+-------------+-------------+
| Not on file    | Not on file | Not on file |
+----------------+-------------+-------------+
 
 
 
+----------------+--------------+------------+
| Travel History | Travel Start | Travel End |
+----------------+--------------+------------+
 
 
 
 
+-------------------------------------+
| No recent travel history available. |
+-------------------------------------+
 documented as of this encounter
 
 Plan of Treatment
 Not on filedocumented as of this encounter
 
 Visit Diagnoses
 Not on filedocumented in this encounter

## 2020-06-06 NOTE — XMS
Encounter Summary
  Created on: 2020
 
 Caverly, Paul Duane
 External Reference #: [id number]
 : 72
 Sex: Male
 
 Demographics
 
 
+-----------------------+-----------------------------+
| Address               | [address]                  |
|                       | [place]  [address] |
+-----------------------+-----------------------------+
| Home Phone            | [phone]             |
+-----------------------+-----------------------------+
| Preferred Language    | Unknown                     |
+-----------------------+-----------------------------+
| Marital Status        | Single                      |
+-----------------------+-----------------------------+
| Islam Affiliation | Unknown                     |
+-----------------------+-----------------------------+
| Race                  | Unknown                     |
+-----------------------+-----------------------------+
| Ethnic Group          | Unknown                     |
+-----------------------+-----------------------------+
 
 
 Author
 
 
+--------------+--------------------------------------------+
| Author       | [facility] [place]  |
|              | and [place]                                |
+--------------+--------------------------------------------+
| Organization | [facility] [place]  |
|              | and [place]                                |
+--------------+--------------------------------------------+
| Address      | Unknown                                    |
+--------------+--------------------------------------------+
| Phone        | Unavailable                                |
+--------------+--------------------------------------------+
 
 
 
 Support
 
 
+---------+--------------+---------+-----------------+
| Name    | Relationship | Address | Phone           |
+---------+--------------+---------+-----------------+
| Name No | ECON         | Unknown | [phone] |
+---------+--------------+---------+-----------------+
 
 
 
 Care Team Providers
 
 
 
+-----------------------+------+-------------+
| Care Team Member Name | Role | Phone       |
+-----------------------+------+-------------+
 PCP  | Unavailable |
+-----------------------+------+-------------+
 
 
 
 Encounter Details
 
 
+--------+-----------+----------------------+----------------------+-------------+
| Date   | Type      | Department           | Care Team            | Description |
+--------+-----------+----------------------+----------------------+-------------+
| / | Hospital  |   [facility] |   Estrellita Hightower  |             |
|    | Encounter |  MED CTR EMERGENCY   | MD [address]  |             |
|        |           | CENTER  [address] | [address]  [place],  |             |
|        |           |   [place]    | [place] [address]             |             |
|        |           | [address]           | [phone]         |             |
|        |           | [phone]         | [phone] (Fax)   |             |
+--------+-----------+----------------------+----------------------+-------------+
 
 
 
 Social History
 
 
+----------------+-------+-----------+--------+------+
| Tobacco Use    | Types | Packs/Day | Years  | Date |
|                |       |           | Used   |      |
+----------------+-------+-----------+--------+------+
| Never Assessed |       |           |        |      |
+----------------+-------+-----------+--------+------+
 
 
 
+------------------+---------------+
| Sex Assigned at  | Date Recorded |
| Birth            |               |
+------------------+---------------+
| Not on file      |               |
+------------------+---------------+
 
 
 
+----------------+-------------+-------------+
| Job Start Date | Occupation  | Industry    |
+----------------+-------------+-------------+
| Not on file    | Not on file | Not on file |
+----------------+-------------+-------------+
 
 
 
+----------------+--------------+------------+
| Travel History | Travel Start | Travel End |
+----------------+--------------+------------+
 
 
 
 
+-------------------------------------+
| No recent travel history available. |
+-------------------------------------+
 documented as of this encounter
 
 Plan of Treatment
 Not on filedocumented as of this encounter
 
 Visit Diagnoses
 Not on filedocumented in this encounter

## 2020-06-06 NOTE — XMS
Encounter Summary
  Created on: 2020
 
 Caverly, Paul Duane
 External Reference #: [id number]
 : 72
 Sex: Male
 
 Demographics
 
 
+-----------------------+-----------------------------+
| Address               | [address]                  |
|                       | [place]  [address] |
+-----------------------+-----------------------------+
| Home Phone            | [phone]             |
+-----------------------+-----------------------------+
| Preferred Language    | Unknown                     |
+-----------------------+-----------------------------+
| Marital Status        | Single                      |
+-----------------------+-----------------------------+
| Islam Affiliation | Unknown                     |
+-----------------------+-----------------------------+
| Race                  | Unknown                     |
+-----------------------+-----------------------------+
| Ethnic Group          | Unknown                     |
+-----------------------+-----------------------------+
 
 
 Author
 
 
+--------------+--------------------------------------------+
| Author       | [facility] [place]  |
|              | and [place]                                |
+--------------+--------------------------------------------+
| Organization | [facility] [place]  |
|              | and [place]                                |
+--------------+--------------------------------------------+
| Address      | Unknown                                    |
+--------------+--------------------------------------------+
| Phone        | Unavailable                                |
+--------------+--------------------------------------------+
 
 
 
 Support
 
 
+---------+--------------+---------+-----------------+
| Name    | Relationship | Address | Phone           |
+---------+--------------+---------+-----------------+
| Name No | ECON         | Unknown | [phone] |
+---------+--------------+---------+-----------------+
 
 
 
 Care Team Providers
 
 
 
+-----------------------+------+-------------+
| Care Team Member Name | Role | Phone       |
+-----------------------+------+-------------+
 PCP  | Unavailable |
+-----------------------+------+-------------+
 
 
 
 Encounter Details
 
 
+--------+-----------+----------------------+----------------------+-------------+
| Date   | Type      | Department           | Care Team            | Description |
+--------+-----------+----------------------+----------------------+-------------+
| / | Hospital  |   [facility] |   Estrellita Hightower  |             |
|    | Encounter |  MED CTR EMERGENCY   | MD Christine [facility]ONG  |             |
|        |           | CENTER  [address] | [address]  [place],  |             |
|        |           |   [place]    | [place] [address]             |             |
|        |           | [address]           | [phone]         |             |
|        |           | [phone]         | [phone] (Fax)   |             |
+--------+-----------+----------------------+----------------------+-------------+
 
 
 
 Social History
 
 
+----------------+-------+-----------+--------+------+
| Tobacco Use    | Types | Packs/Day | Years  | Date |
|                |       |           | Used   |      |
+----------------+-------+-----------+--------+------+
| Never Assessed |       |           |        |      |
+----------------+-------+-----------+--------+------+
 
 
 
+------------------+---------------+
| Sex Assigned at  | Date Recorded |
| Birth            |               |
+------------------+---------------+
| Not on file      |               |
+------------------+---------------+
 
 
 
+----------------+-------------+-------------+
| Job Start Date | Occupation  | Industry    |
+----------------+-------------+-------------+
| Not on file    | Not on file | Not on file |
+----------------+-------------+-------------+
 
 
 
+----------------+--------------+------------+
| Travel History | Travel Start | Travel End |
+----------------+--------------+------------+
 
 
 
 
+-------------------------------------+
| No recent travel history available. |
+-------------------------------------+
 documented as of this encounter
 
 Plan of Treatment
 Not on filedocumented as of this encounter
 
 Visit Diagnoses
 Not on filedocumented in this encounter

## 2020-06-06 NOTE — XMS
Encounter Summary
  Created on: 2020
 
 Caverly, Paul Duane
 External Reference #: [id number]
 : 72
 Sex: Male
 
 Demographics
 
 
+-----------------------+-----------------------------+
| Address               | [address]                  |
|                       | [place]  [address] |
+-----------------------+-----------------------------+
| Home Phone            | [phone]             |
+-----------------------+-----------------------------+
| Preferred Language    | Unknown                     |
+-----------------------+-----------------------------+
| Marital Status        | Single                      |
+-----------------------+-----------------------------+
| Hindu Affiliation | Unknown                     |
+-----------------------+-----------------------------+
| Race                  | Unknown                     |
+-----------------------+-----------------------------+
| Ethnic Group          | Unknown                     |
+-----------------------+-----------------------------+
 
 
 Author
 
 
+--------------+--------------------------------------------+
| Author       | [facility] [place]  |
|              | and [place]                                |
+--------------+--------------------------------------------+
| Organization | [facility] [place]  |
|              | and [place]                                |
+--------------+--------------------------------------------+
| Address      | Unknown                                    |
+--------------+--------------------------------------------+
| Phone        | Unavailable                                |
+--------------+--------------------------------------------+
 
 
 
 Support
 
 
+---------+--------------+---------+-----------------+
| Name    | Relationship | Address | Phone           |
+---------+--------------+---------+-----------------+
| Name No | ECON         | Unknown | [phone] |
+---------+--------------+---------+-----------------+
 
 
 
 Care Team Providers
 
 
 
+-----------------------+------+-------------+
| Care Team Member Name | Role | Phone       |
+-----------------------+------+-------------+
 PCP  | Unavailable |
+-----------------------+------+-------------+
 
 
 
 Encounter Details
 
 
+--------+-----------+----------------------+-----------+-------------+
| Date   | Type      | Department           | Care Team | Description |
+--------+-----------+----------------------+-----------+-------------+
| / | Hospital  |   [facility] |           |             |
|    | Encounter |  MED CTR XRAY  [address] |           |             |
|        |           |  [address]       |           |             |
|        |           | [place] [address] |           |             |
|        |           |   [phone]       |           |             |
+--------+-----------+----------------------+-----------+-------------+
 
 
 
 Social History
 
 
+----------------+-------+-----------+--------+------+
| Tobacco Use    | Types | Packs/Day | Years  | Date |
|                |       |           | Used   |      |
+----------------+-------+-----------+--------+------+
| Never Assessed |       |           |        |      |
+----------------+-------+-----------+--------+------+
 
 
 
+------------------+---------------+
| Sex Assigned at  | Date Recorded |
| Birth            |               |
+------------------+---------------+
| Not on file      |               |
+------------------+---------------+
 
 
 
+----------------+-------------+-------------+
| Job Start Date | Occupation  | Industry    |
+----------------+-------------+-------------+
| Not on file    | Not on file | Not on file |
+----------------+-------------+-------------+
 
 
 
+----------------+--------------+------------+
| Travel History | Travel Start | Travel End |
+----------------+--------------+------------+
 
 
 
 
+-------------------------------------+
| No recent travel history available. |
+-------------------------------------+
 documented as of this encounter
 
 Plan of Treatment
 Not on filedocumented as of this encounter
 
 Visit Diagnoses
 Not on filedocumented in this encounter

## 2020-06-06 NOTE — XMS
Encounter Summary
  Created on: 2020
 
 Caverly, Paul Duane
 External Reference #: [id number]
 : 72
 Sex: Male
 
 Demographics
 
 
+-----------------------+-----------------------------+
| Address               | [address]                  |
|                       | [place]  [address] |
+-----------------------+-----------------------------+
| Home Phone            | [phone]             |
+-----------------------+-----------------------------+
| Preferred Language    | Unknown                     |
+-----------------------+-----------------------------+
| Marital Status        | Single                      |
+-----------------------+-----------------------------+
| Yazidi Affiliation | Unknown                     |
+-----------------------+-----------------------------+
| Race                  | Unknown                     |
+-----------------------+-----------------------------+
| Ethnic Group          | Unknown                     |
+-----------------------+-----------------------------+
 
 
 Author
 
 
+--------------+--------------------------------------------+
| Author       | [facility] [place]  |
|              | and [place]                                |
+--------------+--------------------------------------------+
| Organization | [facility] [place]  |
|              | and [place]                                |
+--------------+--------------------------------------------+
| Address      | Unknown                                    |
+--------------+--------------------------------------------+
| Phone        | Unavailable                                |
+--------------+--------------------------------------------+
 
 
 
 Support
 
 
+---------+--------------+---------+-----------------+
| Name    | Relationship | Address | Phone           |
+---------+--------------+---------+-----------------+
| Name No | ECON         | Unknown | [phone] |
+---------+--------------+---------+-----------------+
 
 
 
 Care Team Providers
 
 
 
+-----------------------+------+-------------+
| Care Team Member Name | Role | Phone       |
+-----------------------+------+-------------+
 PCP  | Unavailable |
+-----------------------+------+-------------+
 
 
 
 Encounter Details
 
 
+--------+-----------+----------------------+-------------------+-------------+
| Date   | Type      | Department           | Care Team         | Description |
+--------+-----------+----------------------+-------------------+-------------+
| 10/04/ | [facility]  |   [facility] |   Parvez Vizcaino  |             |
|    | Encounter |  MED CTR XRAY  [address] | [address]  |             |
|        |           |  [place]       | updated address   |             |
|        |           | [place] [address] |                   |             |
|        |           |   [phone]       |                   |             |
+--------+-----------+----------------------+-------------------+-------------+
 
 
 
 Social History
 
 
+----------------+-------+-----------+--------+------+
| Tobacco Use    | Types | Packs/Day | Years  | Date |
|                |       |           | Used   |      |
+----------------+-------+-----------+--------+------+
| Never Assessed |       |           |        |      |
+----------------+-------+-----------+--------+------+
 
 
 
+------------------+---------------+
| Sex Assigned at  | Date Recorded |
| Birth            |               |
+------------------+---------------+
| Not on file      |               |
+------------------+---------------+
 
 
 
+----------------+-------------+-------------+
| Job Start Date | Occupation  | Industry    |
+----------------+-------------+-------------+
| Not on file    | Not on file | Not on file |
+----------------+-------------+-------------+
 
 
 
+----------------+--------------+------------+
| Travel History | Travel Start | Travel End |
+----------------+--------------+------------+
 
 
 
 
+-------------------------------------+
| No recent travel history available. |
+-------------------------------------+
 documented as of this encounter
 
 Plan of Treatment
 Not on filedocumented as of this encounter
 
 Visit Diagnoses
 Not on filedocumented in this encounter

## 2020-06-06 NOTE — XMS
Encounter Summary
  Created on: 2020
 
 Caverly, Paul Duane
 External Reference #: [id number]
 : 72
 Sex: Male
 
 Demographics
 
 
+-----------------------+-----------------------------+
| Address               | [address]                  |
|                       | [place]  [address] |
+-----------------------+-----------------------------+
| Home Phone            | [phone]             |
+-----------------------+-----------------------------+
| Preferred Language    | Unknown                     |
+-----------------------+-----------------------------+
| Marital Status        | Single                      |
+-----------------------+-----------------------------+
| Orthodox Affiliation | Unknown                     |
+-----------------------+-----------------------------+
| Race                  | Unknown                     |
+-----------------------+-----------------------------+
| Ethnic Group          | Unknown                     |
+-----------------------+-----------------------------+
 
 
 Author
 
 
+--------------+--------------------------------------------+
| Author       | [facility] [place]  |
|              | and [place]                                |
+--------------+--------------------------------------------+
| Organization | [facility] [place]  |
|              | and [place]                                |
+--------------+--------------------------------------------+
| Address      | Unknown                                    |
+--------------+--------------------------------------------+
| Phone        | Unavailable                                |
+--------------+--------------------------------------------+
 
 
 
 Support
 
 
+---------+--------------+---------+-----------------+
| Name    | Relationship | Address | Phone           |
+---------+--------------+---------+-----------------+
| Name No | ECON         | Unknown | [phone] |
+---------+--------------+---------+-----------------+
 
 
 
 Care Team Providers
 
 
 
+-----------------------+------+-------------+
| Care Team Member Name | Role | Phone       |
+-----------------------+------+-------------+
 PCP  | Unavailable |
+-----------------------+------+-------------+
 
 
 
 Encounter Details
 
 
+--------+-----------+----------------------+---------------------+-------------+
| Date   | Type      | Department           | Care Team           | Description |
+--------+-----------+----------------------+---------------------+-------------+
| / | Hospital  |   [facility] |   Obed Hodge  |             |
|    | Encounter |  MED CTR EMERGENCY   | MD [address]    |             |
|        |           | CENTER  [address] | [place]     |             |
|        |           |   [place]    | [place] [address]     |             |
|        |           | [address]           | [phone]        |             |
|        |           | [phone]         | [phone] (Fax)  |             |
+--------+-----------+----------------------+---------------------+-------------+
 
 
 
 Social History
 
 
+----------------+-------+-----------+--------+------+
| Tobacco Use    | Types | Packs/Day | Years  | Date |
|                |       |           | Used   |      |
+----------------+-------+-----------+--------+------+
| Never Assessed |       |           |        |      |
+----------------+-------+-----------+--------+------+
 
 
 
+------------------+---------------+
| Sex Assigned at  | Date Recorded |
| Birth            |               |
+------------------+---------------+
| Not on file      |               |
+------------------+---------------+
 
 
 
+----------------+-------------+-------------+
| Job Start Date | Occupation  | Industry    |
+----------------+-------------+-------------+
| Not on file    | Not on file | Not on file |
+----------------+-------------+-------------+
 
 
 
+----------------+--------------+------------+
| Travel History | Travel Start | Travel End |
+----------------+--------------+------------+
 
 
 
 
+-------------------------------------+
| No recent travel history available. |
+-------------------------------------+
 documented as of this encounter
 
 Plan of Treatment
 Not on filedocumented as of this encounter
 
 Visit Diagnoses
 Not on filedocumented in this encounter

## 2020-06-06 NOTE — XMS
Encounter Summary
  Created on: 2020
 
 Caverly, Paul Duane
 External Reference #: [id number]
 : 72
 Sex: Male
 
 Demographics
 
 
+-----------------------+-----------------------------+
| Address               | [address]                  |
|                       | [place]  [address] |
+-----------------------+-----------------------------+
| Home Phone            | [phone]             |
+-----------------------+-----------------------------+
| Preferred Language    | Unknown                     |
+-----------------------+-----------------------------+
| Marital Status        | Single                      |
+-----------------------+-----------------------------+
| Synagogue Affiliation | Unknown                     |
+-----------------------+-----------------------------+
| Race                  | Unknown                     |
+-----------------------+-----------------------------+
| Ethnic Group          | Unknown                     |
+-----------------------+-----------------------------+
 
 
 Author
 
 
+--------------+--------------------------------------------+
| Author       | [facility] [place]  |
|              | and [place]                                |
+--------------+--------------------------------------------+
| Organization | [facility] [place]  |
|              | and [place]                                |
+--------------+--------------------------------------------+
| Address      | Unknown                                    |
+--------------+--------------------------------------------+
| Phone        | Unavailable                                |
+--------------+--------------------------------------------+
 
 
 
 Support
 
 
+---------+--------------+---------+-----------------+
| Name    | Relationship | Address | Phone           |
+---------+--------------+---------+-----------------+
| Name No | ECON         | Unknown | [phone] |
+---------+--------------+---------+-----------------+
 
 
 
 Care Team Providers
 
 
 
+-----------------------+------+-------------+
| Care Team Member Name | Role | Phone       |
+-----------------------+------+-------------+
 PCP  | Unavailable |
+-----------------------+------+-------------+
 
 
 
 Encounter Details
 
 
+--------+-----------+----------------------+-------------------+-------------+
| Date   | Type      | Department           | Care Team         | Description |
+--------+-----------+----------------------+-------------------+-------------+
| / | [facility]  |   [facility] |   Parvez Vizcaino  |             |
|    | Encounter |  MED CTR XRAY  [address] | [address]  |             |
|        |           |  [place]       | updated address   |             |
|        |           | [place] [address] |                   |             |
|        |           |   [phone]       |                   |             |
+--------+-----------+----------------------+-------------------+-------------+
 
 
 
 Social History
 
 
+----------------+-------+-----------+--------+------+
| Tobacco Use    | Types | Packs/Day | Years  | Date |
|                |       |           | Used   |      |
+----------------+-------+-----------+--------+------+
| Never Assessed |       |           |        |      |
+----------------+-------+-----------+--------+------+
 
 
 
+------------------+---------------+
| Sex Assigned at  | Date Recorded |
| Birth            |               |
+------------------+---------------+
| Not on file      |               |
+------------------+---------------+
 
 
 
+----------------+-------------+-------------+
| Job Start Date | Occupation  | Industry    |
+----------------+-------------+-------------+
| Not on file    | Not on file | Not on file |
+----------------+-------------+-------------+
 
 
 
+----------------+--------------+------------+
| Travel History | Travel Start | Travel End |
+----------------+--------------+------------+
 
 
 
 
+-------------------------------------+
| No recent travel history available. |
+-------------------------------------+
 documented as of this encounter
 
 Plan of Treatment
 Not on filedocumented as of this encounter
 
 Visit Diagnoses
 Not on filedocumented in this encounter

## 2020-06-06 NOTE — XMS
Encounter Summary
  Created on: 2020
 
 Caverly, Paul Duane
 External Reference #: [id number]
 : 72
 Sex: Male
 
 Demographics
 
 
+-----------------------+-----------------------------+
| Address               | [address]                  |
|                       | [place]  [address] |
+-----------------------+-----------------------------+
| Home Phone            | [phone]             |
+-----------------------+-----------------------------+
| Preferred Language    | Unknown                     |
+-----------------------+-----------------------------+
| Marital Status        | Single                      |
+-----------------------+-----------------------------+
| Yarsanism Affiliation | Unknown                     |
+-----------------------+-----------------------------+
| Race                  | Unknown                     |
+-----------------------+-----------------------------+
| Ethnic Group          | Unknown                     |
+-----------------------+-----------------------------+
 
 
 Author
 
 
+--------------+--------------------------------------------+
| Author       | [facility]  |
|              | and [place]                                |
+--------------+--------------------------------------------+
| Organization | [facility]  |
|              | and [place]                                |
+--------------+--------------------------------------------+
| Address      | Unknown                                    |
+--------------+--------------------------------------------+
| Phone        | Unavailable                                |
+--------------+--------------------------------------------+
 
 
 
 Support
 
 
+---------+--------------+---------+-----------------+
| Name    | Relationship | Address | Phone           |
+---------+--------------+---------+-----------------+
| Name No | ECON         | Unknown | [phone] |
+---------+--------------+---------+-----------------+
 
 
 
 Care Team Providers
 
 
 
+-----------------------+------+-------------+
| Care Team Member Name | Role | Phone       |
+-----------------------+------+-------------+
 PCP  | Unavailable |
+-----------------------+------+-------------+
 
 
 
 Encounter Details
 
 
+--------+-----------+----------------------+----------------------+-------------+
| Date   | Type      | Department           | Care Team            | Description |
+--------+-----------+----------------------+----------------------+-------------+
| / | Hospital  |   [facility] |   Estrellita Hightower  |             |
|    | Encounter |  MED CTR EMERGENCY   | MD Christine [facility]  |             |
|        |           | CENTER  [address] | [address]  [place],  |             |
|        |           |   [place]    | [place] [address]             |             |
|        |           | [address]           | [phone]         |             |
|        |           | [phone]         | [phone] (Fax)   |             |
+--------+-----------+----------------------+----------------------+-------------+
 
 
 
 Social History
 
 
+----------------+-------+-----------+--------+------+
| Tobacco Use    | Types | Packs/Day | Years  | Date |
|                |       |           | Used   |      |
+----------------+-------+-----------+--------+------+
| Never Assessed |       |           |        |      |
+----------------+-------+-----------+--------+------+
 
 
 
+------------------+---------------+
| Sex Assigned at  | Date Recorded |
| Birth            |               |
+------------------+---------------+
| Not on file      |               |
+------------------+---------------+
 
 
 
+----------------+-------------+-------------+
| Job Start Date | Occupation  | Industry    |
+----------------+-------------+-------------+
| Not on file    | Not on file | Not on file |
+----------------+-------------+-------------+
 
 
 
+----------------+--------------+------------+
| Travel History | Travel Start | Travel End |
+----------------+--------------+------------+
 
 
 
 
+-------------------------------------+
| No recent travel history available. |
+-------------------------------------+
 documented as of this encounter
 
 Plan of Treatment
 Not on filedocumented as of this encounter
 
 Visit Diagnoses
 Not on filedocumented in this encounter

## 2020-06-06 NOTE — XMS
Encounter Summary
  Created on: 2020
 
 Caverly, Paul Duane
 External Reference #: [id number]
 : 72
 Sex: Male
 
 Demographics
 
 
+-----------------------+-----------------------------+
| Address               | [address]                  |
|                       | [place]  [address] |
+-----------------------+-----------------------------+
| Home Phone            | [phone]             |
+-----------------------+-----------------------------+
| Preferred Language    | Unknown                     |
+-----------------------+-----------------------------+
| Marital Status        | Single                      |
+-----------------------+-----------------------------+
| Anabaptist Affiliation | Unknown                     |
+-----------------------+-----------------------------+
| Race                  | Unknown                     |
+-----------------------+-----------------------------+
| Ethnic Group          | Unknown                     |
+-----------------------+-----------------------------+
 
 
 Author
 
 
+--------------+--------------------------------------------+
| Author       | [facility] [place]  |
|              | and [place]                                |
+--------------+--------------------------------------------+
| Organization | [facility] [place]  |
|              | and [place]                                |
+--------------+--------------------------------------------+
| Address      | Unknown                                    |
+--------------+--------------------------------------------+
| Phone        | Unavailable                                |
+--------------+--------------------------------------------+
 
 
 
 Support
 
 
+---------+--------------+---------+-----------------+
| Name    | Relationship | Address | Phone           |
+---------+--------------+---------+-----------------+
| Name No | ECON         | Unknown | [phone] |
+---------+--------------+---------+-----------------+
 
 
 
 Care Team Providers
 
 
 
+-----------------------+------+-------------+
| Care Team Member Name | Role | Phone       |
+-----------------------+------+-------------+
 PCP  | Unavailable |
+-----------------------+------+-------------+
 
 
 
 Encounter Details
 
 
+--------+-----------+----------------------+---------------------+-------------+
| Date   | Type      | Department           | Care Team           | Description |
+--------+-----------+----------------------+---------------------+-------------+
| / | [facility]  |   [facility] |   Julia Vizcaino  |             |
|    | Encounter |  MED CTR XRAY  [address]     |             |
|        |           |  [address]       | [address]  [place]   |             |
|        |           | [place] [address] | [place] [address]     |             |
|        |           |   [phone]       | [phone]        |             |
|        |           |                      | [phone] (Fax)  |             |
+--------+-----------+----------------------+---------------------+-------------+
 
 
 
 Social History
 
 
+----------------+-------+-----------+--------+------+
| Tobacco Use    | Types | Packs/Day | Years  | Date |
|                |       |           | Used   |      |
+----------------+-------+-----------+--------+------+
| Never Assessed |       |           |        |      |
+----------------+-------+-----------+--------+------+
 
 
 
+------------------+---------------+
| Sex Assigned at  | Date Recorded |
| Birth            |               |
+------------------+---------------+
| Not on file      |               |
+------------------+---------------+
 
 
 
+----------------+-------------+-------------+
| Job Start Date | Occupation  | Industry    |
+----------------+-------------+-------------+
| Not on file    | Not on file | Not on file |
+----------------+-------------+-------------+
 
 
 
+----------------+--------------+------------+
| Travel History | Travel Start | Travel End |
+----------------+--------------+------------+
 
 
 
 
+-------------------------------------+
| No recent travel history available. |
+-------------------------------------+
 documented as of this encounter
 
 Plan of Treatment
 Not on filedocumented as of this encounter
 
 Visit Diagnoses
 Not on filedocumented in this encounter

## 2020-06-06 NOTE — XMS
Encounter Summary
  Created on: 2020
 
 Caverly, Paul Duane
 External Reference #: [id number]
 : 72
 Sex: Male
 
 Demographics
 
 
+-----------------------+-----------------------------+
| Address               | [address]                  |
|                       | [place]  [address] |
+-----------------------+-----------------------------+
| Home Phone            | [phone]             |
+-----------------------+-----------------------------+
| Preferred Language    | Unknown                     |
+-----------------------+-----------------------------+
| Marital Status        | Single                      |
+-----------------------+-----------------------------+
| Temple Affiliation | Unknown                     |
+-----------------------+-----------------------------+
| Race                  | Unknown                     |
+-----------------------+-----------------------------+
| Ethnic Group          | Unknown                     |
+-----------------------+-----------------------------+
 
 
 Author
 
 
+--------------+--------------------------------------------+
| Author       | [facility] [place]  |
|              | and [place]                                |
+--------------+--------------------------------------------+
| Organization | [facility]  |
|              | and [place]                                |
+--------------+--------------------------------------------+
| Address      | Unknown                                    |
+--------------+--------------------------------------------+
| Phone        | Unavailable                                |
+--------------+--------------------------------------------+
 
 
 
 Support
 
 
+---------+--------------+---------+-----------------+
| Name    | Relationship | Address | Phone           |
+---------+--------------+---------+-----------------+
| Name No | ECON         | Unknown | [phone] |
+---------+--------------+---------+-----------------+
 
 
 
 Care Team Providers
 
 
 
+-----------------------+------+-------------+
| Care Team Member Name | Role | Phone       |
+-----------------------+------+-------------+
 PCP  | Unavailable |
+-----------------------+------+-------------+
 
 
 
 Encounter Details
 
 
+--------+-----------+----------------------+-----------+-------------+
| Date   | Type      | Department           | Care Team | Description |
+--------+-----------+----------------------+-----------+-------------+
| / | Hospital  |   [facility] |           |             |
|    | Encounter |  MED CTR EMERGENCY   |           |             |
|        |           | CENTER  [address] |           |             |
|        |           |   [place]    |           |             |
|        |           | [address]           |           |             |
|        |           | [phone]         |           |             |
+--------+-----------+----------------------+-----------+-------------+
 
 
 
 Social History
 
 
+----------------+-------+-----------+--------+------+
| Tobacco Use    | Types | Packs/Day | Years  | Date |
|                |       |           | Used   |      |
+----------------+-------+-----------+--------+------+
| Never Assessed |       |           |        |      |
+----------------+-------+-----------+--------+------+
 
 
 
+------------------+---------------+
| Sex Assigned at  | Date Recorded |
| Birth            |               |
+------------------+---------------+
| Not on file      |               |
+------------------+---------------+
 
 
 
+----------------+-------------+-------------+
| Job Start Date | Occupation  | Industry    |
+----------------+-------------+-------------+
| Not on file    | Not on file | Not on file |
+----------------+-------------+-------------+
 
 
 
+----------------+--------------+------------+
| Travel History | Travel Start | Travel End |
+----------------+--------------+------------+
 
 
 
 
+-------------------------------------+
| No recent travel history available. |
+-------------------------------------+
 documented as of this encounter
 
 Plan of Treatment
 Not on filedocumented as of this encounter
 
 Visit Diagnoses
 Not on filedocumented in this encounter

## 2020-06-06 NOTE — XMS
Encounter Summary
  Created on: 2020
 
 Caverly, Paul Duane
 External Reference #: [id number]
 : 72
 Sex: Male
 
 Demographics
 
 
+-----------------------+-----------------------------+
| Address               | [address]                  |
|                       | [place]  [address] |
+-----------------------+-----------------------------+
| Home Phone            | [phone]             |
+-----------------------+-----------------------------+
| Preferred Language    | Unknown                     |
+-----------------------+-----------------------------+
| Marital Status        | Single                      |
+-----------------------+-----------------------------+
| Restorationist Affiliation | Unknown                     |
+-----------------------+-----------------------------+
| Race                  | Unknown                     |
+-----------------------+-----------------------------+
| Ethnic Group          | Unknown                     |
+-----------------------+-----------------------------+
 
 
 Author
 
 
+--------------+--------------------------------------------+
| Author       | [facility] [place]  |
|              | and [place]                                |
+--------------+--------------------------------------------+
| Organization | [facility] [place]  |
|              | and [place]                                |
+--------------+--------------------------------------------+
| Address      | Unknown                                    |
+--------------+--------------------------------------------+
| Phone        | Unavailable                                |
+--------------+--------------------------------------------+
 
 
 
 Support
 
 
+---------+--------------+---------+-----------------+
| Name    | Relationship | Address | Phone           |
+---------+--------------+---------+-----------------+
| Name No | ECON         | Unknown | [phone] |
+---------+--------------+---------+-----------------+
 
 
 
 Care Team Providers
 
 
 
+-----------------------+------+-------------+
| Care Team Member Name | Role | Phone       |
+-----------------------+------+-------------+
 PCP  | Unavailable |
+-----------------------+------+-------------+
 
 
 
 Encounter Details
 
 
+--------+-----------+----------------------+---------------------+-------------+
| Date   | Type      | Department           | Care Team           | Description |
+--------+-----------+----------------------+---------------------+-------------+
| / | Hospital  |   [facility] |   Obed Hodge  |             |
|    | Encounter |  MED CTR EMERGENCY   | MD [address]    |             |
|        |           | CENTER  [address] | [place]     |             |
|        |           |   [place]    | [place] [address]     |             |
|        |           | [address]           | [phone]        |             |
|        |           | [phone]         | [phone] (Fax)  |             |
+--------+-----------+----------------------+---------------------+-------------+
 
 
 
 Social History
 
 
+----------------+-------+-----------+--------+------+
| Tobacco Use    | Types | Packs/Day | Years  | Date |
|                |       |           | Used   |      |
+----------------+-------+-----------+--------+------+
| Never Assessed |       |           |        |      |
+----------------+-------+-----------+--------+------+
 
 
 
+------------------+---------------+
| Sex Assigned at  | Date Recorded |
| Birth            |               |
+------------------+---------------+
| Not on file      |               |
+------------------+---------------+
 
 
 
+----------------+-------------+-------------+
| Job Start Date | Occupation  | Industry    |
+----------------+-------------+-------------+
| Not on file    | Not on file | Not on file |
+----------------+-------------+-------------+
 
 
 
+----------------+--------------+------------+
| Travel History | Travel Start | Travel End |
+----------------+--------------+------------+
 
 
 
 
+-------------------------------------+
| No recent travel history available. |
+-------------------------------------+
 documented as of this encounter
 
 Plan of Treatment
 Not on filedocumented as of this encounter
 
 Visit Diagnoses
 Not on filedocumented in this encounter

## 2020-06-06 NOTE — XMS
Clinical Summary
  Created on: 2020
 
 Caverly, Paul Duane
 External Reference #: [id number]
 : 72
 Sex: Male
 
 Demographics
 
 
+-----------------------+-----------------------------+
| Address               | [address]                  |
|                       | [place]  [address] |
+-----------------------+-----------------------------+
| Home Phone            | [phone]             |
+-----------------------+-----------------------------+
| Preferred Language    | Unknown                     |
+-----------------------+-----------------------------+
| Marital Status        | Single                      |
+-----------------------+-----------------------------+
| Roman Catholic Affiliation | Unknown                     |
+-----------------------+-----------------------------+
| Race                  | Unknown                     |
+-----------------------+-----------------------------+
| Ethnic Group          | Unknown                     |
+-----------------------+-----------------------------+
 
 
 Author
 
 
+--------------+--------------------------------------------+
| Author       | [facility]  |
|              | and [place]                                |
+--------------+--------------------------------------------+
| Organization | [facility]  |
|              | and [place]                                |
+--------------+--------------------------------------------+
| Address      | Unknown                                    |
+--------------+--------------------------------------------+
| Phone        | Unavailable                                |
+--------------+--------------------------------------------+
 
 
 
 Support
 
 
+---------+--------------+---------+-----------------+
| Name    | Relationship | Address | Phone           |
+---------+--------------+---------+-----------------+
| Name No | ECON         | Unknown | [phone] |
+---------+--------------+---------+-----------------+
 
 
 
 Care Team Providers
 
 
 
+-----------------------+------+-------------+
| Care Team Member Name | Role | Phone       |
+-----------------------+------+-------------+
| No, Physician         | PCP  | Unavailable |
+-----------------------+------+-------------+
 
 
 
 Allergies
 
 
+----------------+-----------+----------+----------+----------+
| Active Allergy | Reactions | Severity | Noted    | Comments |
|                |           |          | Date     |          |
+----------------+-----------+----------+----------+----------+
| Amoxicillin    | Hives     |          | 20 |          |
|                |           |          | 18       |          |
+----------------+-----------+----------+----------+----------+
 
 
 
 Medications
 
 
+----------------------+----------------------+-----------+---------+------+------+-------+
| Medication           | Sig                  | Dispensed | Refills | Star | End  | Statu |
|                      |                      |           |         | t    | Date | s     |
|                      |                      |           |         | Date |      |       |
+----------------------+----------------------+-----------+---------+------+------+-------+
|   amitriptyline      | Take 200 mg by mouth |           | 0       |      |      | Activ |
| (ELAVIL) 100 MG      |  nightly.            |           |         |      |      | e     |
| tablet               |                      |           |         |      |      |       |
+----------------------+----------------------+-----------+---------+------+------+-------+
|   naproxen           | Take 1 one tablet    |   30      | 0       | 03/0 |      | Activ |
| (NAPROSYN) 500 mg    | with food twice      | tablet    |         | 7/20 |      | e     |
| tabletIndications:   | daily for 5 days,    |           |         | 18   |      |       |
| Cellulitis of right  | then as needed for   |           |         |      |      |       |
| hand                 | pain.                |           |         |      |      |       |
+----------------------+----------------------+-----------+---------+------+------+-------+
|   traMADol (ULTRAM)  | Take 1 tablet by     |   12      | 0       | 03/0 |      | Activ |
| 50 mg                | mouth every 8 hours  | tablet    |         | 7/20 |      | e     |
| tabletIndications:   | as needed.           |           |         | 18   |      |       |
| Cellulitis of right  |                      |           |         |      |      |       |
| hand                 |                      |           |         |      |      |       |
+----------------------+----------------------+-----------+---------+------+------+-------+
 
 
 
 Active Problems
 No known active problems
 
 Social History
 
 
+--------------------+-------+-----------+--------+------+
| Tobacco Use        | Types | Packs/Day | Years  | Date |
|                    |       |           | Used   |      |
+--------------------+-------+-----------+--------+------+
 
| Current Every Day  |       |           |        |      |
| Smoker             |       |           |        |      |
+--------------------+-------+-----------+--------+------+
 
 
 
+---------------------+---+---+---+
| Smokeless Tobacco:  |   |   |   |
| Never Used          |   |   |   |
+---------------------+---+---+---+
 
 
 
+------------------+---------------+
| Sex Assigned at  | Date Recorded |
| Birth            |               |
+------------------+---------------+
| Not on file      |               |
+------------------+---------------+
 
 
 
+----------------+-------------+-------------+
| Job Start Date | Occupation  | Industry    |
+----------------+-------------+-------------+
| Not on file    | Not on file | Not on file |
+----------------+-------------+-------------+
 
 
 
+----------------+--------------+------------+
| Travel History | Travel Start | Travel End |
+----------------+--------------+------------+
 
 
 
+-------------------------------------+
| No recent travel history available. |
+-------------------------------------+
 
 
 
 Last Filed Vital Signs
 
 
+-------------------+----------------------+----------------------+----------+
| Vital Sign        | Reading              | Time Taken           | Comments |
+-------------------+----------------------+----------------------+----------+
| Blood Pressure    | 124/84               | 2018  1:11 PM  |          |
|                   |                      | PST                  |          |
+-------------------+----------------------+----------------------+----------+
| Pulse             | 103                  | 2018  1:11 PM  |          |
|                   |                      | PST                  |          |
+-------------------+----------------------+----------------------+----------+
| Temperature       | 36.3   C (97.4   F)  | 2018  1:11 PM  |          |
|                   |                      | PST                  |          |
+-------------------+----------------------+----------------------+----------+
| Respiratory Rate  | 16                   | 2018  1:11 PM  |          |
|                   |                      | PST                  |          |
+-------------------+----------------------+----------------------+----------+
 
| Oxygen Saturation | 99%                  | 2018  1:11 PM  |          |
|                   |                      | PST                  |          |
+-------------------+----------------------+----------------------+----------+
| Inhaled Oxygen    | -                    | -                    |          |
| Concentration     |                      |                      |          |
+-------------------+----------------------+----------------------+----------+
| Weight            | 87.3 kg (192 lb 7.4  | 2018  1:11 PM  |          |
|                   | oz)                  | PST                  |          |
+-------------------+----------------------+----------------------+----------+
| Height            | 185.4 cm (6' 1")     | 2018  1:11 PM  |          |
|                   |                      | PST                  |          |
+-------------------+----------------------+----------------------+----------+
| Body Mass Index   | 25.39                | 2018  1:11 PM  |          |
|                   |                      | PST                  |          |
+-------------------+----------------------+----------------------+----------+
 
 
 
 Plan of Treatment
 
 
+---------------------+-----------+-----------+----------+
| Health Maintenance  | Due Date  | Last Done | Comments |
+---------------------+-----------+-----------+----------+
| Vaccine:            |  |           |          |
| Dtap/Tdap/Td (1 -   | 2         |           |          |
| Tdap)               |           |           |          |
+---------------------+-----------+-----------+----------+
| Vaccine: Influenza  |  |           |          |
| (Season Ended)      | 0         |           |          |
+---------------------+-----------+-----------+----------+
 
 
 
 Results
 Not on filefrom Last 3 Months
 
 Insurance
 
 
+-------------------+--------+-------------+--------+-------------+---------+--------+
| Payer             | Benefi | Subscriber  | Effect | Phone       | Address | Type   |
|                   | t Plan | ID          | rachel    |             |         |        |
|                   |  /     |             | Dates  |             |         |        |
|                   | Group  |             |        |             |         |        |
+-------------------+--------+-------------+--------+-------------+---------+--------+
| [employer]  | [employer]   | [id number]    | 3/7/20 | [phone] |         | [employer] |
| MEDICAID HMO      | HEALTH |             | 18-Pre | 1           |         | id     |
|                   |  MDCD  |             | sent   |             |         |        |
|                   | HMO OR |             |        |             |         |        |
+-------------------+--------+-------------+--------+-------------+---------+--------+
 
 
 
+--------------------+--------+-------------+--------+-------------+----------------------+
| Guarantor Name     | Accoun | Relation to | Date   | Phone       | Billing Address      |
|                    | t Type |  Patient    | of     |             |                      |
|                    |        |             | Birth  |             |                      |
+--------------------+--------+-------------+--------+-------------+----------------------+
| Caverly,Paul Duane | Person | Self        | / |             |   [address] |
 
|                    | al/Fam |             | 1970   | [phone] |  [place] [address] |
|                    | [place]    |             |        | [address] (Home)    |                      |
+--------------------+--------+-------------+--------+-------------+----------------------+
 
 
 
 Advance Directives
 
 
+-----------+---------------+----------------+-------------+
| Type      | Date Recorded | Patient        | Explanation |
|           |               | Representative |             |
+-----------+---------------+----------------+-------------+
| Power of  |               |                |             |
|   |               |                |             |
+-----------+---------------+----------------+-------------+
| Advance   |               |                |             |
| Directive |               |                |             |
+-----------+---------------+----------------+-------------+

## 2020-06-06 NOTE — XMS
Encounter Summary
  Created on: 2020
 
 Caverly, Paul Duane
 External Reference #: [id number]
 : 72
 Sex: Male
 
 Demographics
 
 
+-----------------------+-----------------------------+
| Address               | [address]                  |
|                       | [place]  [address] |
+-----------------------+-----------------------------+
| Home Phone            | [phone]             |
+-----------------------+-----------------------------+
| Preferred Language    | Unknown                     |
+-----------------------+-----------------------------+
| Marital Status        | Single                      |
+-----------------------+-----------------------------+
| Baptism Affiliation | Unknown                     |
+-----------------------+-----------------------------+
| Race                  | Unknown                     |
+-----------------------+-----------------------------+
| Ethnic Group          | Unknown                     |
+-----------------------+-----------------------------+
 
 
 Author
 
 
+--------------+--------------------------------------------+
| Author       | [facility]  |
|              | and [place]                                |
+--------------+--------------------------------------------+
| Organization | [facility]  |
|              | and [place]                                |
+--------------+--------------------------------------------+
| Address      | Unknown                                    |
+--------------+--------------------------------------------+
| Phone        | Unavailable                                |
+--------------+--------------------------------------------+
 
 
 
 Support
 
 
+---------+--------------+---------+-----------------+
| Name    | Relationship | Address | Phone           |
+---------+--------------+---------+-----------------+
| Name No | ECON         | Unknown | [phone] |
+---------+--------------+---------+-----------------+
 
 
 
 Care Team Providers
 
 
 
+-----------------------+------+-------------+
| Care Team Member Name | Role | Phone       |
+-----------------------+------+-------------+
 PCP  | Unavailable |
+-----------------------+------+-------------+
 
 
 
 Encounter Details
 
 
+--------+-----------+----------------------+----------------------+-------------+
| Date   | Type      | Department           | Care Team            | Description |
+--------+-----------+----------------------+----------------------+-------------+
| / | Hospital  |   [facility] |   Estrellita Hightower  |             |
|    | Encounter |  [facility]   | MD Christine HONG  |             |
|        |           | CENTER  [address] | [address]  [place],  |             |
|        |           |   [place]    | [place] [address]             |             |
|        |           | [address]           | [phone]         |             |
|        |           | [phone]         | [phone] (Fax)   |             |
+--------+-----------+----------------------+----------------------+-------------+
 
 
 
 Social History
 
 
+----------------+-------+-----------+--------+------+
| Tobacco Use    | Types | Packs/Day | Years  | Date |
|                |       |           | Used   |      |
+----------------+-------+-----------+--------+------+
| Never Assessed |       |           |        |      |
+----------------+-------+-----------+--------+------+
 
 
 
+------------------+---------------+
| Sex Assigned at  | Date Recorded |
| Birth            |               |
+------------------+---------------+
| Not on file      |               |
+------------------+---------------+
 
 
 
+----------------+-------------+-------------+
| Job Start Date | Occupation  | Industry    |
+----------------+-------------+-------------+
| Not on file    | Not on file | Not on file |
+----------------+-------------+-------------+
 
 
 
+----------------+--------------+------------+
| Travel History | Travel Start | Travel End |
+----------------+--------------+------------+
 
 
 
 
+-------------------------------------+
| No recent travel history available. |
+-------------------------------------+
 documented as of this encounter
 
 Plan of Treatment
 Not on filedocumented as of this encounter
 
 Visit Diagnoses
 Not on filedocumented in this encounter

## 2020-06-06 NOTE — XMS
Clinical Summary
  Created on: 2020
 
 Caverly, Paul Duane
 External Reference #: [id number]
 : 72
 Sex: Male
 
 Demographics
 
 
+-----------------------+-----------------------------+
| Address               | [address]                  |
|                       | [place]  [address] |
+-----------------------+-----------------------------+
| Home Phone            | [phone]             |
+-----------------------+-----------------------------+
| Preferred Language    | Unknown                     |
+-----------------------+-----------------------------+
| Marital Status        | Single                      |
+-----------------------+-----------------------------+
| Mormonism Affiliation | Unknown                     |
+-----------------------+-----------------------------+
| Race                  | Unknown                     |
+-----------------------+-----------------------------+
| Ethnic Group          | Unknown                     |
+-----------------------+-----------------------------+
 
 
 Author
 
 
+--------------+--------------------------------------------+
| Author       | [facility]  |
|              | [facility] [place]                                |
+--------------+--------------------------------------------+
| Organization | [facility]  |
|              | [facility] [place]                                |
+--------------+--------------------------------------------+
| Address      | Unknown                                    |
+--------------+--------------------------------------------+
| Phone        | Unavailable                                |
+--------------+--------------------------------------------+
 
 
 
 Support
 
 
+---------+--------------+---------+-----------------+
| Name    | Relationship | Address | Phone           |
+---------+--------------+---------+-----------------+
| Name No | ECON         | Unknown | [phone] |
+---------+--------------+---------+-----------------+
 
 
 
 Care Team Providers
 
 
 
+-----------------------+------+-------------+
| Care Team Member Name | Role | Phone       |
+-----------------------+------+-------------+
| No, Physician         | PCP  | Unavailable |
+-----------------------+------+-------------+
 
 
 
 Allergies
 
 
+----------------+-----------+----------+----------+----------+
| Active Allergy | Reactions | Severity | Noted    | Comments |
|                |           |          | Date     |          |
+----------------+-----------+----------+----------+----------+
| Amoxicillin    | Hives     |          | 20 |          |
|                |           |          | 18       |          |
+----------------+-----------+----------+----------+----------+
 
 
 
 Medications
 
 
+----------------------+----------------------+-----------+---------+------+------+-------+
| Medication           | Sig                  | Dispensed | Refills | Star | End  | Statu |
|                      |                      |           |         | t    | Date | s     |
|                      |                      |           |         | Date |      |       |
+----------------------+----------------------+-----------+---------+------+------+-------+
|   amitriptyline      | Take 200 mg by mouth |           | 0       |      |      | Activ |
| (ELAVIL) 100 MG      |  nightly.            |           |         |      |      | e     |
| tablet               |                      |           |         |      |      |       |
+----------------------+----------------------+-----------+---------+------+------+-------+
|   naproxen           | Take 1 one tablet    |   30      | 0       | 03/0 |      | Activ |
| (NAPROSYN) 500 mg    | with food twice      | tablet    |         | 7/20 |      | e     |
| tabletIndications:   | daily for 5 days,    |           |         | 18   |      |       |
| Cellulitis of right  | then as needed for   |           |         |      |      |       |
| hand                 | pain.                |           |         |      |      |       |
+----------------------+----------------------+-----------+---------+------+------+-------+
|   traMADol (ULTRAM)  | Take 1 tablet by     |   12      | 0       | 03/0 |      | Activ |
| 50 mg                | mouth every 8 hours  | tablet    |         | 7/20 |      | e     |
| tabletIndications:   | as needed.           |           |         | 18   |      |       |
| Cellulitis of right  |                      |           |         |      |      |       |
| hand                 |                      |           |         |      |      |       |
+----------------------+----------------------+-----------+---------+------+------+-------+
 
 
 
 Active Problems
 No known active problems
 
 Social History
 
 
+--------------------+-------+-----------+--------+------+
| Tobacco Use        | Types | Packs/Day | Years  | Date |
|                    |       |           | Used   |      |
+--------------------+-------+-----------+--------+------+
 
| Current Every Day  |       |           |        |      |
| Smoker             |       |           |        |      |
+--------------------+-------+-----------+--------+------+
 
 
 
+---------------------+---+---+---+
| Smokeless Tobacco:  |   |   |   |
| Never Used          |   |   |   |
+---------------------+---+---+---+
 
 
 
+------------------+---------------+
| Sex Assigned at  | Date Recorded |
| Birth            |               |
+------------------+---------------+
| Not on file      |               |
+------------------+---------------+
 
 
 
+----------------+-------------+-------------+
| Job Start Date | Occupation  | Industry    |
+----------------+-------------+-------------+
| Not on file    | Not on file | Not on file |
+----------------+-------------+-------------+
 
 
 
+----------------+--------------+------------+
| Travel History | Travel Start | Travel End |
+----------------+--------------+------------+
 
 
 
+-------------------------------------+
| No recent travel history available. |
+-------------------------------------+
 
 
 
 Last Filed Vital Signs
 
 
+-------------------+----------------------+----------------------+----------+
| Vital Sign        | Reading              | Time Taken           | Comments |
+-------------------+----------------------+----------------------+----------+
| Blood Pressure    | 124/84               | 2018  1:11 PM  |          |
|                   |                      | PST                  |          |
+-------------------+----------------------+----------------------+----------+
| Pulse             | 103                  | 2018  1:11 PM  |          |
|                   |                      | PST                  |          |
+-------------------+----------------------+----------------------+----------+
| Temperature       | 36.3   C (97.4   F)  | 2018  1:11 PM  |          |
|                   |                      | PST                  |          |
+-------------------+----------------------+----------------------+----------+
| Respiratory Rate  | 16                   | 2018  1:11 PM  |          |
|                   |                      | PST                  |          |
+-------------------+----------------------+----------------------+----------+
 
| Oxygen Saturation | 99%                  | 2018  1:11 PM  |          |
|                   |                      | PST                  |          |
+-------------------+----------------------+----------------------+----------+
| Inhaled Oxygen    | -                    | -                    |          |
| Concentration     |                      |                      |          |
+-------------------+----------------------+----------------------+----------+
| Weight            | 87.3 kg (192 lb 7.4  | 2018  1:11 PM  |          |
|                   | oz)                  | PST                  |          |
+-------------------+----------------------+----------------------+----------+
| Height            | 185.4 cm (6' 1")     | 2018  1:11 PM  |          |
|                   |                      | PST                  |          |
+-------------------+----------------------+----------------------+----------+
| Body Mass Index   | 25.39                | 2018  1:11 PM  |          |
|                   |                      | PST                  |          |
+-------------------+----------------------+----------------------+----------+
 
 
 
 Plan of Treatment
 
 
+---------------------+-----------+-----------+----------+
| Health Maintenance  | Due Date  | Last Done | Comments |
+---------------------+-----------+-----------+----------+
| Vaccine:            |  |           |          |
| Dtap/Tdap/Td (1 -   | 2         |           |          |
| Tdap)               |           |           |          |
+---------------------+-----------+-----------+----------+
| Vaccine: Influenza  |  |           |          |
| (Season Ended)      | 0         |           |          |
+---------------------+-----------+-----------+----------+
 
 
 
 Results
 Not on filefrom Last 3 Months
 
 Insurance
 
 
+-------------------+--------+-------------+--------+-------------+---------+--------+
| Payer             | Benefi | Subscriber  | Effect | Phone       | Address | Type   |
|                   | t Plan | ID          | rachel    |             |         |        |
|                   |  /     |             | Dates  |             |         |        |
|                   | Group  |             |        |             |         |        |
+-------------------+--------+-------------+--------+-------------+---------+--------+
| [employer]  | [employer]   | [id number]    | 3/7/20 | [phone] |         | [employer] |
| MEDICAID HMO      | HEALTH |             | 18-Pre | 1           |         | id     |
|                   |  MDCD  |             | sent   |             |         |        |
|                   | HMO OR |             |        |             |         |        |
+-------------------+--------+-------------+--------+-------------+---------+--------+
 
 
 
+--------------------+--------+-------------+--------+-------------+----------------------+
| Guarantor Name     | Accoun | Relation to | Date   | Phone       | Billing Address      |
|                    | t Type |  Patient    | of     |             |                      |
|                    |        |             | Birth  |             |                      |
+--------------------+--------+-------------+--------+-------------+----------------------+
| Caverly,Paul Duane | Person | Self        | / |             |   [address] |
 
|                    | al/Fam |             | 1970   | [phone] |  [place] [address] |
|                    | [place]    |             |        | [address] (Home)    |                      |
+--------------------+--------+-------------+--------+-------------+----------------------+
 
 
 
 Advance Directives
 
 
+-----------+---------------+----------------+-------------+
| Type      | Date Recorded | Patient        | Explanation |
|           |               | Representative |             |
+-----------+---------------+----------------+-------------+
| Power of  |               |                |             |
|   |               |                |             |
+-----------+---------------+----------------+-------------+
| Advance   |               |                |             |
| Directive |               |                |             |
+-----------+---------------+----------------+-------------+

## 2020-06-06 NOTE — XMS
Encounter Summary
  Created on: 2020
 
 Caverly, Paul Duane
 External Reference #: [id number]
 : 72
 Sex: Male
 
 Demographics
 
 
+-----------------------+-----------------------------+
| Address               | [address]                  |
|                       | [place]  [address] |
+-----------------------+-----------------------------+
| Home Phone            | [phone]             |
+-----------------------+-----------------------------+
| Preferred Language    | Unknown                     |
+-----------------------+-----------------------------+
| Marital Status        | Single                      |
+-----------------------+-----------------------------+
| Voodoo Affiliation | Unknown                     |
+-----------------------+-----------------------------+
| Race                  | Unknown                     |
+-----------------------+-----------------------------+
| Ethnic Group          | Unknown                     |
+-----------------------+-----------------------------+
 
 
 Author
 
 
+--------------+--------------------------------------------+
| Author       | [facility] [place]  |
|              | and [place]                                |
+--------------+--------------------------------------------+
| Organization | [facility] [place]  |
|              | and [place]                                |
+--------------+--------------------------------------------+
| Address      | Unknown                                    |
+--------------+--------------------------------------------+
| Phone        | Unavailable                                |
+--------------+--------------------------------------------+
 
 
 
 Support
 
 
+---------+--------------+---------+-----------------+
| Name    | Relationship | Address | Phone           |
+---------+--------------+---------+-----------------+
| Name No | ECON         | Unknown | [phone] |
+---------+--------------+---------+-----------------+
 
 
 
 Care Team Providers
 
 
 
+-----------------------+------+-------------+
| Care Team Member Name | Role | Phone       |
+-----------------------+------+-------------+
| No, Physician         | PCP  | Unavailable |
+-----------------------+------+-------------+
 
 
 
 Reason for Visit
 
 
+---------------+-----------------------------+
| Reason        | Comments                    |
+---------------+-----------------------------+
| Hand Swelling | room 2/ right hand x 3 days |
+---------------+-----------------------------+
 
 
 
 Encounter Details
 
 
+--------+---------+----------------------+----------------------+----------------------+
| Date   | Type    | Department           | Care Team            | Description          |
+--------+---------+----------------------+----------------------+----------------------+
| / | Office  |   [facility]   |   Damian Vidal | Cellulitis of right  |
| 2018   | Visit   | CARE  [address] |  MD [place]OTHY  [address]   | hand (Primary Dx)    |
|        |         |   [place]    | [address]  [place] |                      |
|        |         | [address]           |  [address]  [phone] |                      |
|        |         | [phone]         |   [phone] (Fax) |                      |
+--------+---------+----------------------+----------------------+----------------------+
 
 
 
 Social History
 
 
+--------------------+-------+-----------+--------+------+
| Tobacco Use        | Types | Packs/Day | Years  | Date |
|                    |       |           | Used   |      |
+--------------------+-------+-----------+--------+------+
| Current Every Day  |       |           |        |      |
| Smoker             |       |           |        |      |
+--------------------+-------+-----------+--------+------+
 
 
 
+---------------------+---+---+---+
| Smokeless Tobacco:  |   |   |   |
| Never Used          |   |   |   |
+---------------------+---+---+---+
 
 
 
+------------------+---------------+
| Sex Assigned at  | Date Recorded |
| Birth            |               |
+------------------+---------------+
 
| Not on file      |               |
+------------------+---------------+
 
 
 
+----------------+-------------+-------------+
| Job Start Date | Occupation  | Industry    |
+----------------+-------------+-------------+
| Not on file    | Not on file | Not on file |
+----------------+-------------+-------------+
 
 
 
+----------------+--------------+------------+
| Travel History | Travel Start | Travel End |
+----------------+--------------+------------+
 
 
 
+-------------------------------------+
| No recent travel history available. |
+-------------------------------------+
 documented as of this encounter
 
 Last Filed Vital Signs
 
 
+-------------------+----------------------+----------------------+----------+
| Vital Sign        | Reading              | Time Taken           | Comments |
+-------------------+----------------------+----------------------+----------+
| Blood Pressure    | 124/84               | 2018  1:11 PM  |          |
|                   |                      | PST                  |          |
+-------------------+----------------------+----------------------+----------+
| Pulse             | 103                  | 2018  1:11 PM  |          |
|                   |                      | PST                  |          |
+-------------------+----------------------+----------------------+----------+
| Temperature       | 36.3   C (97.4   F)  | 2018  1:11 PM  |          |
|                   |                      | PST                  |          |
+-------------------+----------------------+----------------------+----------+
| Respiratory Rate  | 16                   | 2018  1:11 PM  |          |
|                   |                      | PST                  |          |
+-------------------+----------------------+----------------------+----------+
| Oxygen Saturation | 99%                  | 2018  1:11 PM  |          |
|                   |                      | PST                  |          |
+-------------------+----------------------+----------------------+----------+
| Inhaled Oxygen    | -                    | -                    |          |
| Concentration     |                      |                      |          |
+-------------------+----------------------+----------------------+----------+
| Weight            | 87.3 kg (192 lb 7.4  | 2018  1:11 PM  |          |
|                   | oz)                  | PST                  |          |
+-------------------+----------------------+----------------------+----------+
| Height            | 185.4 cm (6' 1")     | 2018  1:11 PM  |          |
|                   |                      | PST                  |          |
+-------------------+----------------------+----------------------+----------+
| Body Mass Index   | 25.39                | 2018  1:11 PM  |          |
|                   |                      | PST                  |          |
+-------------------+----------------------+----------------------+----------+
 documented in this encounter
 
 Patient Instructions
 
 Patient Instructions Damian Vidal MD - 2018 12:45 PM PSTRocephin shot was pr
ovided for infection.
 Take Keflex 500 mg 4 times daily for 10 days for skin infection.
 If you develop an itchy rash, hives, lip swelling or wheezing, stop Keflex and report to [facility] emergency room.
 Take Naprosyn 500 mg, 1 tablet with food twice daily for 5 days, then as needed for pain an
d swelling.
 Take tramadol 50 mg, one tablet up to 3 times daily as needed for breakthrough pain.
 The right hand and arm above the level of your heart as needed for pain and swelling.
 Use warm, moist compresses on the hand and forearm for 15 minutes at a time 3 times daily u
ntil improved.
 Stay home from work avoiding repetitive gripping, grasping and lifting with the right hand 
until symptoms have resolved.
 Return for reevaluation of your hand and forearm tomorrow.
 Report to emergency room if he developed progressive pain, redness, swelling, nausea, vomit
ing or other new rapidly progressive associated symptoms.
 
 Cellulitis
 Cellulitis is an infection of the deep layers of skin. A break in the skin, such as a cut o
r scratch, can let bacteria under the skin. If the bacteria get to deep layers of the skin, 
it can be serious. If not treated, cellulitis can get into the bloodstream and lymph nodes. 
The infection can then spread throughout the body. This causes serious illness.
 Cellulitis causes the affected skin to become red, swollen, warm, and sore. The reddened ar
eas have a visible border. An open sore may leak fluid (pus). You may have a fever, chills, 
and pain.
 Cellulitis is treated with antibiotics taken for 7 to 10 days. An open sore may be cleaned 
and covered with cool wet gauze. Symptoms should get better 1 to 2 days after treatment is s
tarted. Make sure to take all the antibiotics for the full number of days until they are apryl
e. Keep taking the medicine even if your symptoms go away.
 Home care
 Follow these tips:
  Limit the use of the part of your body with cellulitis.
  If the infection is on your leg, keep your leg raised while sitting. This will help to r
educe swelling.
  Take all of the antibiotic medicine exactly as directed until it is gone. Do not miss an
y doses, especially during the first 7 days. Don  t stop taking the medicine when your symp
toms get better.
  Keep the affected area clean and dry.
  Wash your hands with soap and warm water before and after touching your skin. Anyone els
e who touches your skin should also wash his or her hands. Don't share towels.
 Follow-up care
 Follow up with your healthcare provider, or as advised. If your infection does not go away 
on the first antibiotic, your healthcare provider will prescribe a different one.
 When to seek medical advice
 Call your healthcare provider right away if any of these occur:
  Red areas that spread
  Swelling or pain that gets worse
  Fluid leaking from the skin (pus)
  Fever higher of 100.4 F (38.0 C) or higher after 2 days on antibiotics
 Date Last Reviewed: 2016-2017 [employer]. [address] [place] [address]. All righ
ts reserved. This information is not intended as a substitute for professional medical care.
 Always follow your healthcare professional's instructions.
 
 Electronically signed by Damian Vidal MD at 2018  2:31 PM PST
 documented in this encounter
 
 Progress Notes
 Mandy Lazaro RN - 2018 12:45 PM PSTFormatting of this note might be different
 from the original.
 
 Administrations This Visit  
  cefTRIAXone (ROCEPHIN) injection 1 g  
  Admin Date
 2018 Action
 Given Dose
 1 g Route
 Intramuscular Administered By
 Mandy Lazaro RN 
  
  
  lidocaine 1% injection 2.1 mL  
  Admin Date
 2018 Action
 Given Dose
 2.1 mL Route
 Intramuscular Administered By
 Mandy Lazaro RN 
  
  
  
 
 Pt tolerated well. Mandy Lazaro RN
 Electronically signed by Mandy Lazaro RN at 2018  2:55 PM Winston, Antonietta AGUIRRE MD - 2018 12:45 PM PSTFormatting of this note might be different from the origina
l.
 3/7/2018
 
 Paul Duane Caverly
 1970
 
 Assessment:
 1. Cellulitis of right hand  cefTRIAXone (ROCEPHIN) injection 1 g 
  lidocaine 1% injection 2.1 mL 
  naproxen (NAPROSYN) 500 mg tablet 
  traMADol (ULTRAM) 50 mg tablet 
  cephalexin (KEFLEX) 500 mg capsule 
 
 Three-day history of progressive cellulitis on the back of the right hand associated with m
ultiple small abrasions on the hand, a new tattoo on the right forearm placed 10 days ago.  
No evidence of septic arthritis or systemic symptoms.  He agrees with treatment with IM Roce
phin after discussing the risks and benefits given his penicillin allergy.  We discussed the
 option of starting oral clindamycin or waiting for intravenous dose of alternative antibiot
ic.  He opted for the Rocephin IM.  He was given the following instructions, prescriptions a
nd a note for work.  Follow-up here tomorrow is anticipated.
 
 Plan:
 
 Rocephin shot was provided for infection.
 Take Keflex 500 mg 4 times daily for 10 days for skin infection.
 If you develop an itchy rash, hives, lip swelling or wheezing, stop Keflex and report to [facility] emergency room.
 Take Naprosyn 500 mg, 1 tablet with food twice daily for 5 days, then as needed for pain an
d swelling.
 Take tramadol 50 mg, one tablet up to 3 times daily as needed for breakthrough pain.
 The right hand and arm above the level of your heart as needed for pain and swelling.
 Use warm, moist compresses on the hand and forearm for 15 minutes at a time 3 times daily u
ntil improved.
 Stay home from work avoiding repetitive gripping, grasping and lifting with the right hand 
until symptoms have resolved.
 Return for reevaluation of your hand and forearm tomorrow.
 
 Report to emergency room if he developed progressive pain, redness, swelling, nausea, vomit
ing or other new rapidly progressive associated symptoms.
 
 The risks and benefits, including potential side effects of medication changes, have been d
iscussed with the patient.  We agreed on implementing the current plan.
 The note may have been dictated using Dragon voice recognition software.  It may have not b
een proofread in entirety.  Minor errors in grammar may occur.
 
 History:
 Chief Complaint 
 Patient presents with 
   Hand Swelling 
   room 2/ right hand x 3 days 
 
 Paul Duane Caverly is a 48 y.o. male here for painful redness and swelling on the back of h
is right hand extending onto his forearm starting 3 days ago.  He is a  and frequently
 cuts his fingers and hands in the process of daily work.  He had a tattoo placed on the vol
ar aspect of his right forearm 10 days ago which has been without redness, swelling and drai
nage.  He had difficulty sleeping last night applying warm compress and elevating his arm.  
He went to work today and cannot due to pain with use of the hand and arm.  Denies other inj
ury to the right hand, no history of trauma. History of cellulitis of the left lower leg one
 month ago treated at [facility] with resolution, doesn't recall the antibiotic.  He
 denies fever but feels intermittent chills.  Mild nausea without vomiting.  He has eaten to
day with normal bowel movement.  No history of diabetes mellitus or MRSA infection.
 
 Current medications, past medical, surgical, family and social histories were reviewed and 
updated where appropriate.
 
 Allergies 
 Allergen Reactions 
   Amoxicillin Hives 
 
 Physical Exam:
 /84  | Pulse 103  | Temp 36.3 C (97.4 F) (Temporal)  | Resp 16  | Ht 1.854 m (6' 
1")  | Wt 87.3 kg (192 lb 7.4 oz)  | SpO2 99%  | BMI 25.39 kg/m 
 General: Well appearing, middle-aged male in no distress and appears stated age.
 Skin: Multiple, small, scabbed abrasions on multiple fingers and the back of his right hand
.  No pustules, fluctuance or purulent drainage.  Fingers are callused.  Diffusely erythemat
ous, tender, warm and edematous skin on back of his hand without fluctuance or ecchymosis.  
Mild erythema extends onto the dorsal aspect of the wrist and forearm with several small fol
licular pustules noted on the proximal forearm.  Again, no fluctuance or drainage.  New tatt
oo noted on the proximal, volar forearm without inflammation or drainage.
 Musculoskeletal: Opens and closes the fist completely with minimal pain.  Normal finger spl
ay.  Range of motion of the right wrist and elbow are complete with minimal discomfort.
 Lymphatic: Perhaps mild lymphangitis on the dorsum of the right forearm.  No antecubital or
 axillary adenopathy.
 Neurovascular: Intact radial pulses.  Light touch sensation and capillary refill intact thr
oughout the right hand.
 
 Damian Vidal M.D.
 
 Electronically signed by Damian Vidal MD at 2018  2:55 PM PSTdocumented in 
is encounter
 
 Plan of Treatment
 Not on filedocumented as of this encounter
 
 Visit Diagnoses
 
 
 
+------------------------------------------------------------------------------------------+
| Diagnosis                                                                                |
+------------------------------------------------------------------------------------------+
|   Cellulitis of right hand - Primary  Cellulitis and abscess of hand, except fingers and |
|  thumb                                                                                   |
+------------------------------------------------------------------------------------------+
 documented in this encounter
 
 Administered Medications
 
 
+-----------------------------------+--------+----------+------+------+----------+
| Medication Order                  | MAR    | Action   | Dose | Rate | Site     |
|                                   | Action | Date     |      |      |          |
+-----------------------------------+--------+----------+------+------+----------+
|   cefTRIAXone (ROCEPHIN)          | Given  | 20 | 1 g  |      | Ventrogl |
| injection 1 g  1 g,               |        | 18  2:15 |      |      | uteal-Le |
| Intramuscular, ONCE, Wed 3/7/18   |        |  PM PST  |      |      | ft       |
| at 1430, For 1 dose, MIX WITH 2.1 |        |          |      |      |          |
|  ML LIDOCAINE, Indications:       |        |          |      |      |          |
| PURULENT SKIN AND SOFT TISSUE     |        |          |      |      |          |
| INFECTION                         |        |          |      |      |          |
+-----------------------------------+--------+----------+------+------+----------+
 
 
 
+---+---+
|   |   |
+---+---+
 
 
 
+-----------------------------------+-------+----------+---------+---+----------+
|   lidocaine 1% injection 2.1 mL   | Given | 20 | 2.1 mLs |   | Ventrogl |
| 2.1 mL, Intramuscular, ONCE, Wed  |       | 18  2:18 |         |   | uteal-Le |
| 3/7/18 at 1430, For 1 dose        |       |  PM PST  |         |   | ft       |
+-----------------------------------+-------+----------+---------+---+----------+
 
 
 
+---+---+
|   |   |
+---+---+
 documented in this encounter

## 2020-06-06 NOTE — XMS
Encounter Summary
  Created on: 2020
 
 Caverly, Paul Duane
 External Reference #: [id number]
 : 72
 Sex: Male
 
 Demographics
 
 
+-----------------------+-----------------------------+
| Address               | [address]                  |
|                       | [place]  [address] |
+-----------------------+-----------------------------+
| Home Phone            | [phone]             |
+-----------------------+-----------------------------+
| Preferred Language    | Unknown                     |
+-----------------------+-----------------------------+
| Marital Status        | Single                      |
+-----------------------+-----------------------------+
| [employer]ar[employer] Affiliation | Unknown                     |
+-----------------------+-----------------------------+
| Race                  | Unknown                     |
+-----------------------+-----------------------------+
| Ethnic Group          | Unknown                     |
+-----------------------+-----------------------------+
 
 
 Author
 
 
+--------------+--------------------------------------------+
| Author       | [facility]  |
|              | and [place]                                |
+--------------+--------------------------------------------+
| Organization | [facility]  |
|              | and [place]                                |
+--------------+--------------------------------------------+
| Address      | Unknown                                    |
+--------------+--------------------------------------------+
| Phone        | Unavailable                                |
+--------------+--------------------------------------------+
 
 
 
 Support
 
 
+---------+--------------+---------+-----------------+
| Name    | Relationship | Address | Phone           |
+---------+--------------+---------+-----------------+
| Name No | ECON         | Unknown | [phone] |
+---------+--------------+---------+-----------------+
 
 
 
 Care Team Providers
 
 
 
+-----------------------+------+-------------+
| Care Team Member Name | Role | Phone       |
+-----------------------+------+-------------+
 PCP  | Unavailable |
+-----------------------+------+-------------+
 
 
 
 Encounter Details
 
 
+--------+-----------+----------------------+----------------------+-------------+
| Date   | Type      | Department           | Care Team            | Description |
+--------+-----------+----------------------+----------------------+-------------+
| / | Hospital  |   [facility] |   Jada,       |             |
|    | Encounter |  [facility]   | Jerzy ORR MD  [address]  |             |
|        |           | [address]  [address] | [place]     |             |
|        |           |   [place]    | [place] [address] |             |
|        |           | [address]           |   [phone]       |             |
|        |           | [phone]         | [phone] (Fax)   |             |
+--------+-----------+----------------------+----------------------+-------------+
 
 
 
 Social History
 
 
+----------------+-------+-----------+--------+------+
| Tobacco Use    | Types | Packs/Day | Years  | Date |
|                |       |           | Used   |      |
+----------------+-------+-----------+--------+------+
| Never Assessed |       |           |        |      |
+----------------+-------+-----------+--------+------+
 
 
 
+------------------+---------------+
| Sex Assigned at  | Date Recorded |
| Birth            |               |
+------------------+---------------+
| Not on file      |               |
+------------------+---------------+
 
 
 
+----------------+-------------+-------------+
| Job Start Date | Occupation  | Industry    |
+----------------+-------------+-------------+
| Not on file    | Not on file | Not on file |
+----------------+-------------+-------------+
 
 
 
+----------------+--------------+------------+
| Travel History | Travel Start | Travel End |
+----------------+--------------+------------+
 
 
 
 
+-------------------------------------+
| No recent travel history available. |
+-------------------------------------+
 documented as of this encounter
 
 Plan of Treatment
 Not on filedocumented as of this encounter
 
 Visit Diagnoses
 Not on filedocumented in this encounter

## 2024-09-03 NOTE — XMS
PreManage Notification: BECKI ORTIZ MRN:[id number]
 
Security Information
 
Security Events
No recent Security Events currently on file
 
 
 
CRITERIA MET
------------
- Group Notification
 
 
CARE PROVIDERS
-------------------------------------------------------------------------------------
-, [place]-     Dentist: General Practice     [facility]
 
PHONE: [phone]
-------------------------------------------------------------------------------------
GILBERTO HUFF      Physician Assistant     Current
 
PHONE: Unknown
-------------------------------------------------------------------------------------
STEPHEN PHELAN      Physician Assistant     Current
 
PHONE: Unknown
-------------------------------------------------------------------------------------
 
[facility] has no Care Guidelines for this patient.
Care History
Medical/Surgical
01/09/2019    [facility]
 
      - W CALLED PATIENT 2X LEFT A VOICEMAIL.
      - PATIENT NEEDS TO APPLY FOR MEDICAL BENEFITS- PLEASE CONTACT [facility] - [id number] TO HAVE PATIENT APPLY FOR BENEFITS.
      - PATIENT DOES NOT HAVE A PCP - PLEASE REFER PATIENT TO [facility] FOR
      FOLLOW UP - TO ESTABLISH CARE.
      - [facility] SENT NO PCP LETTER TO PATIENT.
LEONARDO VISIT COUNT (12 MO.)
-------------------------------------------------------------------------------------
[address]
 
1 JARETT Rosa
-------------------------------------------------------------------------------------
TOTAL 3
-------------------------------------------------------------------------------------
NOTE: Visits indicate total known visits.
 
ED/UCC VISIT TRACKING (12 MO.)
-------------------------------------------------------------------------------------
05/02/2023 09:33
[facility] [place]
 
 
TYPE: Emergency
 
COMPLAINT:
- SHORTNESS OF BREATH
-------------------------------------------------------------------------------------
01/12/2023 07:10
[facility]
 
TYPE: Emergency
 
DIAGNOSES:
- Pain in left ankle and joints of left foot
- Pain in right ankle and joints of right foot
- Pain in right foot
- RIGHT ANKLE PAIN   ABD HERNIA
-------------------------------------------------------------------------------------
[facility]2022 13[facility]
 
[facility]
 
DIAGNOSES:
- Cellulitis of unspecified part of limb
- Leg Swelling
- swollen feet poss infection pain in hands
-------------------------------------------------------------------------------------
 
 
INPATIENT VISIT TRACKING (12 MO.)
No inpatient visits to display in this time frame
 
[email or web address]